# Patient Record
Sex: MALE | Race: OTHER
[De-identification: names, ages, dates, MRNs, and addresses within clinical notes are randomized per-mention and may not be internally consistent; named-entity substitution may affect disease eponyms.]

---

## 2019-01-01 ENCOUNTER — HOSPITAL ENCOUNTER (EMERGENCY)
Dept: HOSPITAL 25 - UCEAST | Age: 54
Discharge: HOME | End: 2019-01-01
Payer: COMMERCIAL

## 2019-01-01 VITALS — SYSTOLIC BLOOD PRESSURE: 124 MMHG | DIASTOLIC BLOOD PRESSURE: 72 MMHG

## 2019-01-01 DIAGNOSIS — J09.X2: Primary | ICD-10-CM

## 2019-01-01 DIAGNOSIS — Z87.891: ICD-10-CM

## 2019-01-01 PROCEDURE — G0463 HOSPITAL OUTPT CLINIC VISIT: HCPCS

## 2019-01-01 PROCEDURE — 99212 OFFICE O/P EST SF 10 MIN: CPT

## 2019-01-01 NOTE — UC
FLU HPI





- HPI Summary


HPI Summary: 


3 DAYS AGO DEVELOPED COUGH, RUNNY NOSE AND ST. FELT BETTER AFTER A COUPLE OF 

DAYS BUT YESTERDAY DEVELOPED FEVER 102, BODY ACHES, JOINT ACHES, HA AND 

FATIGUE. UP TO DATE FLU VACCINE.





- History of Current Complaint


Chief Complaint: UCGeneralIllness


Stated Complaint: FEVER


Time Seen by Provider: 01/01/19 07:24


Hx Obtained From: Patient


Onset/Duration: Sudden Onset, Lasting Days, Still Present


Severity Currently: Moderate


Severity Initially: Moderate


Pain Intensity: 7


Pain Scale Used: 0-10 Numeric


Associated Signs & Symptoms: Positive: Fever, Myalgia, Cough, Sore Throat, 

Nasal Congestion, Headache





- Allergy/Home Medications


Allergies/Adverse Reactions: 


 Allergies











Allergy/AdvReac Type Severity Reaction Status Date / Time


 


No Known Allergies Allergy   Verified 01/01/19 07:34











Home Medications: 


 Home Medications





Fexofenadine/Pseudoephedrine [Allegra-D 24 Hour Tablet] 1 tab PO DAILY PRN 01/01 /19 [History Confirmed 01/01/19]


Guaifenesin/Dextromethorphan [Mucinex Dm ER 1,200-60 mg Tab] 1 tab PO BID PRN 01 /01/19 [History Confirmed 01/01/19]











PMH/Surg Hx/FS Hx/Imm Hx


Previously Healthy: Yes





- Surgical History


Surgical History: Yes


Surgery Procedure, Year, and Place: APPENDIX-KIDNEY STONES.  right shoulder 

repair





- Family History


Known Family History: Positive: Non-Contributory





- Social History


Alcohol Use: Weekly


Substance Use Type: None


Smoking Status (MU): Former Smoker


When Did the Patient Quit Smoking/Using Tobacco: 6 YEARS AGO





- Immunization History


Most Recent Tetanus Shot: UNK





Review of Systems


All Other Systems Reviewed And Are Negative: Yes


Constitutional: Positive: Fever, Chills, Fatigue


ENT: Positive: Nasal Discharge


Respiratory: Positive: Cough


Cardiovascular: Positive: Negative


Gastrointestinal: Positive: Negative


Musculoskeletal: Positive: Arthralgia, Myalgia


Neurological: Positive: Headache





Physical Exam


Triage Information Reviewed: Yes


Appearance: No Pain Distress, Well-Nourished, Ill-Appearing - MILD


Vital Signs: 


 Initial Vital Signs











Temp  96.9 F   01/01/19 07:25


 


Pulse  83   01/01/19 07:25


 


Resp  20   01/01/19 07:25


 


BP  124/72   01/01/19 07:25


 


Pulse Ox  98   01/01/19 07:25








 Laboratory Tests











  01/01/19





  07:50


 


Influenza A (Rapid)  Positive A











Vital Signs Reviewed: Yes


Eyes: Positive: Conjunctiva Clear


ENT: Positive: Hearing grossly normal, Pharynx normal, TMs normal


Neck: Positive: Supple, Nontender, No Lymphadenopathy


Respiratory Exam: Normal


Cardiovascular Exam: Normal


Abdomen Description: Positive: Soft


Musculoskeletal: Positive: No Edema


Neurological: Positive: Alert


Psychological: Positive: Age Appropriate Behavior


Skin: Negative: Rashes





Flu Course/Dx





- Differential Dx/Diagnosis


Provider Diagnosis: 


 Influenza A








Discharge





- Sign-Out/Discharge


Documenting (check all that apply): Patient Departure


All imaging exams completed and their final reports reviewed: No Studies





- Discharge Plan


Condition: Stable


Disposition: HOME


Prescriptions: 


Oseltamivir CAP* [Tamiflu CAP*] 75 mg PO BID #10 cap


Patient Education Materials:  Influenza (ED)


Referrals: 


Tung Alcazar MD [Primary Care Provider] - If Needed


Additional Instructions: 


SWAB POSITIVE FOR INFLUENZA A. TAMIFLU TWICE DAILY FOR 5 DAYS. OTC MEDS AS 

NEEDED FOR FEVER, BODY ACHES. STAY WELL HYDRATED AND RESTED. SEEK FOLLOW-UP IF 

YOU ARE NOT IMPROVING AS EXPECTED. 





- Billing Disposition and Condition


Condition: STABLE


Disposition: Home

## 2019-01-07 ENCOUNTER — HOSPITAL ENCOUNTER (EMERGENCY)
Dept: HOSPITAL 25 - ED | Age: 54
Discharge: HOME | End: 2019-01-07
Payer: COMMERCIAL

## 2019-01-07 VITALS — DIASTOLIC BLOOD PRESSURE: 81 MMHG | SYSTOLIC BLOOD PRESSURE: 136 MMHG

## 2019-01-07 DIAGNOSIS — R73.9: ICD-10-CM

## 2019-01-07 DIAGNOSIS — J18.9: Primary | ICD-10-CM

## 2019-01-07 DIAGNOSIS — Z87.891: ICD-10-CM

## 2019-01-07 DIAGNOSIS — R00.0: ICD-10-CM

## 2019-01-07 LAB
ALBUMIN SERPL BCG-MCNC: 4.1 G/DL (ref 3.2–5.2)
ALBUMIN/GLOB SERPL: 1.4 {RATIO} (ref 1–3)
ALP SERPL-CCNC: 72 U/L (ref 34–104)
ALT SERPL W P-5'-P-CCNC: 32 U/L (ref 7–52)
ANION GAP SERPL CALC-SCNC: 8 MMOL/L (ref 2–11)
AST SERPL-CCNC: 15 U/L (ref 13–39)
BASOPHILS # BLD AUTO: 0 10^3/UL (ref 0–0.2)
BUN SERPL-MCNC: 15 MG/DL (ref 6–24)
BUN/CREAT SERPL: 16 (ref 8–20)
CALCIUM SERPL-MCNC: 9.4 MG/DL (ref 8.6–10.3)
CHLORIDE SERPL-SCNC: 101 MMOL/L (ref 101–111)
CK SERPL-CCNC: 59 U/L (ref 10–223)
EOSINOPHIL # BLD AUTO: 0.2 10^3/UL (ref 0–0.6)
GLOBULIN SER CALC-MCNC: 2.9 G/DL (ref 2–4)
GLUCOSE SERPL-MCNC: 269 MG/DL (ref 70–100)
HCO3 SERPL-SCNC: 27 MMOL/L (ref 22–32)
HCT VFR BLD AUTO: 44 % (ref 42–52)
HGB BLD-MCNC: 15.1 G/DL (ref 14–18)
LYMPHOCYTES # BLD AUTO: 1.5 10^3/UL (ref 1–4.8)
MCH RBC QN AUTO: 29 PG (ref 27–31)
MCHC RBC AUTO-ENTMCNC: 34 G/DL (ref 31–36)
MCV RBC AUTO: 86 FL (ref 80–94)
MONOCYTES # BLD AUTO: 1 10^3/UL (ref 0–0.8)
NEUTROPHILS # BLD AUTO: 4.4 10^3/UL (ref 1.5–7.7)
NRBC # BLD AUTO: 0 10^3/UL
NRBC BLD QL AUTO: 0
PLATELET # BLD AUTO: 239 10^3/UL (ref 150–450)
POTASSIUM SERPL-SCNC: 3.7 MMOL/L (ref 3.5–5)
PROT SERPL-MCNC: 7 G/DL (ref 6.4–8.9)
RBC # BLD AUTO: 5.16 10^6/UL (ref 4–5.4)
SODIUM SERPL-SCNC: 136 MMOL/L (ref 135–145)
WBC # BLD AUTO: 7 10^3/UL (ref 3.5–10.8)

## 2019-01-07 PROCEDURE — 99283 EMERGENCY DEPT VISIT LOW MDM: CPT

## 2019-01-07 PROCEDURE — 36415 COLL VENOUS BLD VENIPUNCTURE: CPT

## 2019-01-07 PROCEDURE — 93005 ELECTROCARDIOGRAM TRACING: CPT

## 2019-01-07 PROCEDURE — 96360 HYDRATION IV INFUSION INIT: CPT

## 2019-01-07 PROCEDURE — 83605 ASSAY OF LACTIC ACID: CPT

## 2019-01-07 PROCEDURE — 82553 CREATINE MB FRACTION: CPT

## 2019-01-07 PROCEDURE — 83880 ASSAY OF NATRIURETIC PEPTIDE: CPT

## 2019-01-07 PROCEDURE — 71046 X-RAY EXAM CHEST 2 VIEWS: CPT

## 2019-01-07 PROCEDURE — 85025 COMPLETE CBC W/AUTO DIFF WBC: CPT

## 2019-01-07 PROCEDURE — 84484 ASSAY OF TROPONIN QUANT: CPT

## 2019-01-07 PROCEDURE — 80053 COMPREHEN METABOLIC PANEL: CPT

## 2019-01-07 PROCEDURE — 86140 C-REACTIVE PROTEIN: CPT

## 2019-01-07 PROCEDURE — 82550 ASSAY OF CK (CPK): CPT

## 2019-01-07 RX ADMIN — ALBUTEROL SULFATE SCH MG: 2.5 SOLUTION RESPIRATORY (INHALATION) at 19:35

## 2019-01-07 RX ADMIN — ALBUTEROL SULFATE SCH MG: 2.5 SOLUTION RESPIRATORY (INHALATION) at 19:08

## 2019-01-07 RX ADMIN — ALBUTEROL SULFATE SCH MG: 2.5 SOLUTION RESPIRATORY (INHALATION) at 19:18

## 2019-01-07 NOTE — ED
Respiratory





- HPI Summary


HPI Summary: 


This patient is a 53 year old M presenting to [facility] accompanied by family with 

a chief complaint of cough that began yesterday. The patient rates the pain 2/

10 in severity. Symptoms aggravated by nothing. Symptoms alleviated by nothing. 

Patient reports weakness and SOB. Patient denies fever. Patient states he was 

diagnosed with the flu on 12/28. Patient states he was feeling better until the 

cough developed.








- History of Current Complaint


Chief Complaint: EDUpperRespComplaint


Stated Complaint: COUGH


Time Seen by Provider: 01/07/19 18:37


Hx Obtained From: Patient


Onset/Duration: Sudden Onset, Lasting Days, Still Present


Timing: Constant


Initial Severity: Mild


Current Severity: Mild


Pain Intensity: 2


Aggravating Factor(s): Nothing


Alleviating Factor(s): Nothing


Associated Signs and Symptoms: SOB





- Allergy/Home Medications


Allergies/Adverse Reactions: 


 Allergies











Allergy/AdvReac Type Severity Reaction Status Date / Time


 


No Known Allergies Allergy   Verified 01/07/19 18:31














PMH/Surg Hx/FS Hx/Imm Hx


Previously Healthy: No


Endocrine/Hematology History: 


   Denies: Hx Diabetes


Cardiovascular History: Reports: Hx Hypercholesterolemia


   Denies: Hx Hypertension, Hx Pacemaker/ICD


 History: Reports: Hx Kidney Stones - 2013 LAST TIME


   Denies: Hx Renal Disease


Sensory History: Reports: Hx Contacts or Glasses


   Denies: Hx Hearing Aid


Opthamlomology History: Reports: Hx Contacts or Glasses


Psychiatric History: 


   Denies: Hx Panic Disorder





- Surgical History


Surgery Procedure, Year, and Place: APPENDIX-KIDNEY STONES.  right shoulder 

repair


Hx Anesthesia Reactions: No


Infectious Disease History: No


Infectious Disease History: 


   Denies: Traveled Outside the [place] in Last 30 Days





- Family History


Known Family History: Positive: Other - Negative anesthesia reaction





- Social History


Occupation: Employed Full-time


Lives: With Family


Alcohol Use: Weekly


Substance Use Type: Reports: None


Smoking Status (MU): Former Smoker





Review of Systems


Negative: Fever


Positive: Shortness Of Breath, Cough


Positive: Weakness


All Other Systems Reviewed And Are Negative: Yes





Physical Exam





- Summary


Physical Exam Summary: 


VITAL SIGNS: Reviewed.


GENERAL: Patient is a well-developed and nourished (MALE OR FEMALE) who is 

lying comfortable in the stretcher. Patient is not in any acute respiratory 

distress.


HEAD AND FACE: No signs of trauma. No ecchymosis, hematomas or skull 

depressions. No sinus tenderness.


EYES: PERRLA, EOMI x 2, No injected conjunctiva, no nystagmus.


EARS: Hearing grossly intact. Ear canals and tympanic membranes are within 

normal limits.


MOUTH: Oropharynx within normal limits.


NECK: Supple, trachea is midline, no adenopathy, no JVD, no carotid bruit, no c-

spine tenderness, neck with full ROM.


CHEST: Symmetric, no tenderness at palpation


LUNGS: No wheezing or crackles. Decreased breath sounds bilaterally.


CVS: Regular rate and rhythm, S1 and S2 present, no murmurs or gallops 

appreciated.


ABDOMEN: Soft, non-tender. No signs of distention. No rebound no guarding, and 

no masses palpated. Bowel sounds are normal.


EXTREMITIES: FROM in all major joints, no edema, no cyanosis or clubbing.


NEURO: Alert and oriented x 3. No acute neurological deficits. Speech is normal 

and follows commands.


SKIN: Dry and warm





Triage Information Reviewed: Yes


Vital Signs On Initial Exam: 


 Initial Vitals











Temp Pulse Resp BP Pulse Ox


 


 97.7 F   86   16   142/95   98 


 


 01/07/19 18:31  01/07/19 18:31  01/07/19 18:31  01/07/19 18:31  01/07/19 18:31











Vital Signs Reviewed: Yes





Diagnostics





- Vital Signs


 Vital Signs











  Temp Pulse Resp BP Pulse Ox


 


 01/07/19 18:31  97.7 F  86  16  142/95  98














- Laboratory


Result Diagrams: 


 01/07/19 18:54





 01/07/19 18:54


Lab Statement: Any lab studies that have been ordered have been reviewed, and 

results considered in the medical decision making process.





- Radiology


  ** Chest XR


Radiology Interpretation Completed By: ED Physician


Summary of Radiographic Findings: CXR reveals, per ED physician, questionable 

right lower lobe infiltrate.





- EKG


  ** 1935


Cardiac Rate: Tachycardia


EKG Rhythm: Sinus Rhythm - 111 BPM


Summary of EKG Findings: An EKG taken at 1935 reveals sinus tachycardia at 111 

BPM with no ST elevations





Disposition





- Course


Assessment/Plan: This patient is a 53 year old M presenting to [facility] 

accompanied by family with a chief complaint of cough that began yesterday. The 

patient rates the pain 2/10 in severity. Symptoms aggravated by nothing. 

Symptoms alleviated by nothing. Patient reports weakness and SOB. Patient 

denies fever. Patient states he was diagnosed with the flu on 12/28. Patient 

states he was feeling better until the cough developed.  The test results 

without any significant abnormality except for glucose of 269 and lactic acid 

2.2. CRP is a 48.5.  Chest x-ray revealed that the patient has a left lower 

lobe early pneumonia.  Since the patient was wheezing the patient was given 

albuterol and the symptoms improved.  The patient also was given IV fluids to 

decrease the sugar and he was started on metformin.  The patient also was given 

azithromycin since I believe that the patient has an early pneumonia.  He was 

also given Robitussin and the symptoms significantly improved.  I discussed all 

the findings and test results with the patient. Patient was instructed to 

return to the emergency room immediately if any of the symptoms return or 

worsens. Plan of care was discussed with the patient and understands and 

agrees. All questions were answered at patient satisfaction.  There were no 

further complaints or concerns. Lung exam before discharge: CTA B/L. Good air 

exchange. No wheezing or crackles heard. CVS: S1 and S2 present. No murmurs 

appreciated. Patient is alert and oriented x 3. Patient is hemodynamically 

stable. Patient will be discharged home with follow up PCP in the next 2-3 days





- Diagnoses


Provider Diagnoses: 


 Pneumonia, Hyperglycemia








Discharge





- Sign-Out/Discharge


Documenting (check all that apply): Patient Departure - Discharge home





- Discharge Plan


Condition: Stable


Disposition: HOME


Prescriptions: 


Albuterol HFA INHALER* [Ventolin HFA Inhaler*] 1 puff INH Q4H #1 mdi


Azithromycin TAB* [Zithromax TAB (Z-MAGALIE) 250 mg #6 tabs] 250 mg PO DAILY #4 tab


guaiFENesin/CODIEN 100MG-10MG* [Robitussin AC 100Mg-10Mg*] 10 ml PO Q6H PRN #

160 ml MDD 40 ml


 PRN Reason: Cough


metFORMIN* [Glucophage 500 MG TAB *] 500 mg PO BID #40 tab


Patient Education Materials:  Albuterol (By breathing), Azithromycin (By mouth)

, Metformin (By mouth), Antitussive/Decongestant/Expectorant (By mouth), 

Pneumonia (ED)


Referrals: 


Tung Alcazar MD [Primary Care Provider] - 2 Days


Additional Instructions: 


RETURN TO THE EMERGENCY DEPARTMENT FOR NEW OR WORSENING SYMPTOMS





- Billing Disposition and Condition


Condition: STABLE


Disposition: Home





- Attestation Statements


Document Initiated by Scribe: Yes


Documenting Scribe: Tabitha Roberson


Provider For Whom Scribe is Documenting (Include Credential): Dr. Cruz London MD


Scribe Attestation: 


I, Tabitha Roberson, scribed for Dr. Cruz London MD on 01/08/19 at 2055. 


Scribe Documentation Reviewed: Yes


Provider Attestation: 


The documentation as recorded by the [email or web address] accurately reflects the 

service I personally performed and the decisions made by me, Dr. Cruz London MD


Status of Scribe Document: Viewed

## 2019-03-03 ENCOUNTER — HOSPITAL ENCOUNTER (INPATIENT)
Dept: HOSPITAL 25 - ED | Age: 54
LOS: 4 days | Discharge: HOME | DRG: 174 | End: 2019-03-07
Attending: INTERNAL MEDICINE | Admitting: INTERNAL MEDICINE
Payer: COMMERCIAL

## 2019-03-03 DIAGNOSIS — Z79.82: ICD-10-CM

## 2019-03-03 DIAGNOSIS — R00.1: ICD-10-CM

## 2019-03-03 DIAGNOSIS — Z87.891: ICD-10-CM

## 2019-03-03 DIAGNOSIS — E78.5: ICD-10-CM

## 2019-03-03 DIAGNOSIS — I34.0: ICD-10-CM

## 2019-03-03 DIAGNOSIS — Z87.442: ICD-10-CM

## 2019-03-03 DIAGNOSIS — I25.5: ICD-10-CM

## 2019-03-03 DIAGNOSIS — Z82.49: ICD-10-CM

## 2019-03-03 DIAGNOSIS — I25.10: ICD-10-CM

## 2019-03-03 DIAGNOSIS — I21.09: Primary | ICD-10-CM

## 2019-03-03 DIAGNOSIS — E11.9: ICD-10-CM

## 2019-03-03 DIAGNOSIS — Z79.02: ICD-10-CM

## 2019-03-03 DIAGNOSIS — J45.909: ICD-10-CM

## 2019-03-03 LAB
ALBUMIN SERPL BCG-MCNC: 4.6 G/DL (ref 3.2–5.2)
ALBUMIN/GLOB SERPL: 1.8 {RATIO} (ref 1–3)
ALP SERPL-CCNC: 64 U/L (ref 34–104)
ALT SERPL W P-5'-P-CCNC: 28 U/L (ref 7–52)
ANION GAP SERPL CALC-SCNC: 12 MMOL/L (ref 2–11)
APTT PPP: 22.9 SECONDS (ref 26–36.3)
AST SERPL-CCNC: 18 U/L (ref 13–39)
BASOPHILS # BLD AUTO: 0 10^3/UL (ref 0–0.2)
BUN SERPL-MCNC: 23 MG/DL (ref 6–24)
BUN/CREAT SERPL: 20.9 (ref 8–20)
CALCIUM SERPL-MCNC: 9.8 MG/DL (ref 8.6–10.3)
CHLORIDE SERPL-SCNC: 102 MMOL/L (ref 101–111)
CK MB SERPL-MCNC: 1.2 NG/ML (ref 0.6–6.3)
CK SERPL-CCNC: 75 U/L (ref 10–223)
EOSINOPHIL # BLD AUTO: 0.2 10^3/UL (ref 0–0.6)
GLOBULIN SER CALC-MCNC: 2.5 G/DL (ref 2–4)
GLUCOSE SERPL-MCNC: 230 MG/DL (ref 70–100)
HCO3 SERPL-SCNC: 25 MMOL/L (ref 22–32)
HCT VFR BLD AUTO: 44 % (ref 42–52)
HGB BLD-MCNC: 14.9 G/DL (ref 14–18)
INR PPP/BLD: 0.95 (ref 0.77–1.02)
LYMPHOCYTES # BLD AUTO: 2.2 10^3/UL (ref 1–4.8)
MCH RBC QN AUTO: 29 PG (ref 27–31)
MCHC RBC AUTO-ENTMCNC: 34 G/DL (ref 31–36)
MCV RBC AUTO: 86 FL (ref 80–94)
MONOCYTES # BLD AUTO: 0.7 10^3/UL (ref 0–0.8)
NEUTROPHILS # BLD AUTO: 4.2 10^3/UL (ref 1.5–7.7)
NRBC # BLD AUTO: 0 10^3/UL
NRBC BLD QL AUTO: 0.1
PLATELET # BLD AUTO: 193 10^3/UL (ref 150–450)
POTASSIUM SERPL-SCNC: 3.7 MMOL/L (ref 3.5–5)
PROT SERPL-MCNC: 7.1 G/DL (ref 6.4–8.9)
RBC # BLD AUTO: 5.16 10^6/UL (ref 4–5.4)
SODIUM SERPL-SCNC: 139 MMOL/L (ref 135–145)
TROPONIN I SERPL-MCNC: 0.03 NG/ML (ref ?–0.04)
WBC # BLD AUTO: 7.3 10^3/UL (ref 3.5–10.8)

## 2019-03-03 PROCEDURE — 99284 EMERGENCY DEPT VISIT MOD MDM: CPT

## 2019-03-03 PROCEDURE — 80053 COMPREHEN METABOLIC PANEL: CPT

## 2019-03-03 PROCEDURE — 36415 COLL VENOUS BLD VENIPUNCTURE: CPT

## 2019-03-03 PROCEDURE — 82947 ASSAY GLUCOSE BLOOD QUANT: CPT

## 2019-03-03 PROCEDURE — 83036 HEMOGLOBIN GLYCOSYLATED A1C: CPT

## 2019-03-03 PROCEDURE — 71045 X-RAY EXAM CHEST 1 VIEW: CPT

## 2019-03-03 PROCEDURE — C1887 CATHETER, GUIDING: HCPCS

## 2019-03-03 PROCEDURE — 93306 TTE W/DOPPLER COMPLETE: CPT

## 2019-03-03 PROCEDURE — 83880 ASSAY OF NATRIURETIC PEPTIDE: CPT

## 2019-03-03 PROCEDURE — 85347 COAGULATION TIME ACTIVATED: CPT

## 2019-03-03 PROCEDURE — 85730 THROMBOPLASTIN TIME PARTIAL: CPT

## 2019-03-03 PROCEDURE — 82553 CREATINE MB FRACTION: CPT

## 2019-03-03 PROCEDURE — 86900 BLOOD TYPING SEROLOGIC ABO: CPT

## 2019-03-03 PROCEDURE — B2151ZZ FLUOROSCOPY OF LEFT HEART USING LOW OSMOLAR CONTRAST: ICD-10-PCS | Performed by: INTERNAL MEDICINE

## 2019-03-03 PROCEDURE — 027034Z DILATION OF CORONARY ARTERY, ONE ARTERY WITH DRUG-ELUTING INTRALUMINAL DEVICE, PERCUTANEOUS APPROACH: ICD-10-PCS | Performed by: INTERNAL MEDICINE

## 2019-03-03 PROCEDURE — C8929 TTE W OR WO FOL WCON,DOPPLER: HCPCS

## 2019-03-03 PROCEDURE — C1725 CATH, TRANSLUMIN NON-LASER: HCPCS

## 2019-03-03 PROCEDURE — 96375 TX/PRO/DX INJ NEW DRUG ADDON: CPT

## 2019-03-03 PROCEDURE — C8924 2D TTE W OR W/O FOL W/CON,FU: HCPCS

## 2019-03-03 PROCEDURE — 4A023N7 MEASUREMENT OF CARDIAC SAMPLING AND PRESSURE, LEFT HEART, PERCUTANEOUS APPROACH: ICD-10-PCS | Performed by: INTERNAL MEDICINE

## 2019-03-03 PROCEDURE — 96374 THER/PROPH/DIAG INJ IV PUSH: CPT

## 2019-03-03 PROCEDURE — 87641 MR-STAPH DNA AMP PROBE: CPT

## 2019-03-03 PROCEDURE — 83874 ASSAY OF MYOGLOBIN: CPT

## 2019-03-03 PROCEDURE — 80048 BASIC METABOLIC PNL TOTAL CA: CPT

## 2019-03-03 PROCEDURE — 82550 ASSAY OF CK (CPK): CPT

## 2019-03-03 PROCEDURE — C1769 GUIDE WIRE: HCPCS

## 2019-03-03 PROCEDURE — 84484 ASSAY OF TROPONIN QUANT: CPT

## 2019-03-03 PROCEDURE — 85025 COMPLETE CBC W/AUTO DIFF WBC: CPT

## 2019-03-03 PROCEDURE — 86901 BLOOD TYPING SEROLOGIC RH(D): CPT

## 2019-03-03 PROCEDURE — B2111ZZ FLUOROSCOPY OF MULTIPLE CORONARY ARTERIES USING LOW OSMOLAR CONTRAST: ICD-10-PCS | Performed by: INTERNAL MEDICINE

## 2019-03-03 PROCEDURE — 83721 ASSAY OF BLOOD LIPOPROTEIN: CPT

## 2019-03-03 PROCEDURE — 86850 RBC ANTIBODY SCREEN: CPT

## 2019-03-03 PROCEDURE — 71275 CT ANGIOGRAPHY CHEST: CPT

## 2019-03-03 PROCEDURE — 76937 US GUIDE VASCULAR ACCESS: CPT

## 2019-03-03 PROCEDURE — 80061 LIPID PANEL: CPT

## 2019-03-03 PROCEDURE — 93308 TTE F-UP OR LMTD: CPT

## 2019-03-03 PROCEDURE — 85610 PROTHROMBIN TIME: CPT

## 2019-03-03 PROCEDURE — 83605 ASSAY OF LACTIC ACID: CPT

## 2019-03-03 PROCEDURE — 74174 CTA ABD&PLVS W/CONTRAST: CPT

## 2019-03-03 PROCEDURE — C1876 STENT, NON-COA/NON-COV W/DEL: HCPCS

## 2019-03-03 PROCEDURE — 93005 ELECTROCARDIOGRAM TRACING: CPT

## 2019-03-03 NOTE — ED
HPI Chest Pain





- HPI Summary


HPI Summary: 


This patient is a 53 year old M brought in by ambulance to ED with a chief 

complaint of upper CP since 2050 s/p getting hit in the anterior chest with a 

soccer ball. Shortly after, he had CP radiating into the back between his 

shoulder blades. The patient was given 324mg ASA and 2 nitro by EMS en route to 

the ED. He says that his CP has decreased a bit but his back pain has worsened 

since onset. He reports no cardiac hx himself but has a lot of cardiac hx in 

the family. The patient rates the pain 6/10 in severity. Symptoms aggravated by 

nothing. Symptoms alleviated by nothing. Patient reports L arm numbness and N/V 

(aggravated by standing up). Patient denies diaphoresis. He reports no similar 

episodes of this pain in the past. PMHx of HM and HLD.





- History of Current Complaint


Time Seen by Provider: 03/03/19 22:07


Hx Obtained From: Patient, EMS


Onset/Duration: Started Hours Ago - at 2050, Still Present


Timing: Constant, Lasting Hours


Initial Severity: Moderate


Current Severity: Moderate


Pain Intensity: 6


Pain Scale Used: 0-10 Numeric


Chest Pain Location: Discrete at: - upper chest pain


Chest Pain Radiates: Yes


Chest Pain Radiates To:: Back - between his shoulder blades


Aggravating Factor(s): Nothing


Alleviating Factor(s): Nothing


Associated Signs and Symptoms: Positive: Chest Pain, Numbness - L arm, 

Vomiting.  Negative: Diaphoresis





- Allergy/Home Medications


Allergies/Adverse Reactions: 


 Allergies











Allergy/AdvReac Type Severity Reaction Status Date / Time


 


No Known Allergies Allergy   Verified 01/07/19 18:31











Home Medications: 


 Home Medications





Rosuvastatin Calcium 1 tab PO DAILY 03/03/19 [History Confirmed 03/03/19]











PMH/Surg Hx/FS Hx/Imm Hx


Endocrine/Hematology History: 


   Denies: Hx Diabetes


Cardiovascular History: Reports: Hx Hypercholesterolemia


   Denies: Hx Hypertension, Hx Pacemaker/ICD


Respiratory History: 


   Denies: Hx Asthma, Hx Chronic Obstructive Pulmonary Disease (COPD)


 History: Reports: Hx Kidney Stones - 2013 LAST TIME


   Denies: Hx Renal Disease


Sensory History: Reports: Hx Contacts or Glasses


   Denies: Hx Hearing Aid


Opthamlomology History: Reports: Hx Contacts or Glasses


Psychiatric History: 


   Denies: Hx Panic Disorder





- Surgical History


Surgery Procedure, Year, and Place: APPENDIX-KIDNEY STONES.  right shoulder 

repair


Hx Anesthesia Reactions: No





- Family History


Known Family History: Positive: Cardiac Disease, Other - Negative anesthesia 

reaction





- Social History


Alcohol Use: Weekly


Substance Use Type: Reports: None


Smoking Status (MU): Former Smoker





Review of Systems


Negative: Skin Diaphoresis


Positive: Chest Pain - upper CP radiating to the back between the shoulder 

blades


Positive: Vomiting, Nausea


Positive: Other - upper CP radiating to the back between the shoulder blades


Positive: Numbness - L arm


All Other Systems Reviewed And Are Negative: Yes





Physical Exam





- Summary


Physical Exam Summary: 


VITAL SIGNS: Reviewed.


GENERAL: Patient is a well-developed and nourished MALE who is lying 

comfortable in the stretcher. Patient is not in any acute respiratory distress.


HEAD AND FACE: No signs of trauma. No ecchymosis, hematomas or skull 

depressions. No sinus tenderness.


EYES: PERRLA, EOMI x 2, No injected conjunctiva, no nystagmus.


EARS: Hearing grossly intact. Ear canals and tympanic membranes are within 

normal limits.


MOUTH: Oropharynx within normal limits.


NECK: Supple, trachea is midline, no adenopathy, no JVD, no carotid bruit, no c-

spine tenderness, neck with full ROM.


CHEST: Symmetric, no tenderness at palpation


LUNGS: Clear to auscultation bilaterally. No wheezing or crackles.


CVS: Bradycardia, Regular rhythm, S1 and S2 present, no murmurs or gallops 

appreciated.


ABDOMEN: Soft, non-tender. No signs of distention. No rebound no guarding, and 

no masses palpated. Bowel sounds are normal.


EXTREMITIES: FROM in all major joints, no edema, no cyanosis or clubbing.


NEURO: Alert and oriented x 3. No acute neurological deficits. Speech is normal 

and follows commands.


SKIN: Dry and warm


Triage Information Reviewed: Yes


Vital Signs On Initial Exam: 





 Initial Vitals











Temp Pulse Resp BP Pulse Ox


 


 97.8 F   58   18   137/94   99 


 


 03/03/19 22:06  03/03/19 22:06  03/03/19 22:06  03/03/19 22:06  03/03/19 22:06











Vital Signs Reviewed: Yes





Diagnostics





- Laboratory


Result Diagrams: 


 03/04/19 05:20





 03/04/19 05:20


Lab Statement: Any lab studies that have been ordered have been reviewed, and 

results considered in the medical decision making process.





- Radiology


  ** CXR


Radiology Interpretation Completed By: ED Physician


Summary of Radiographic Findings: No acute processes. Pending radiologist 

official report.





- CT


  ** CTA chest/abd/pel


CT Interpretation Completed By: Radiologist


Summary of CT Findings: 1. No pulmonary emboli.  2. No aortic dissection, 

aneurysm, or rupture. No additional findings to.  correlate with patient's 

symptomatology.  3. Small hepatic focus likely an arterial portal shunt, flash 

filling.  hemangioma, or FNH. No followup is indicated.  Dr. Munoz has reviewed 

this radiology report.





- EKG


  ** 2208


Cardiac Rate: Bradycardia - 55 BPM


EKG Rhythm: Sinus Bradycardia


Summary of EKG Findings: Peaked T-wave in anteroseptal leads with depression in 

inferior leads.





Re-Evaluation





- Re-Evaluation


  ** First Eval


Re-Evaluation Time: 22:23


Comment: Discussed consult with Dr. Pulido with the patient and plan for 

further care of the patient.





Chest Pain Course/Dx





- Course


Assessment/Plan: This patient is a 53 year old M brought in by ambulance to ED 

with a chief complaint of upper CP since 2050 s/p getting hit in the anterior 

chest with a soccer ball. Shortly after, he had CP radiating into the back 

between his shoulder blades. The patient was given ASA and 2 nitro en route to 

the ED. EKG reveals sinus bradycardia at 55 BPM with peaked T-wave in 

anteroseptal leads with depression in inferior leads. Consulted Dr. Pulido to 

discuss with him about the patient's case. He says to call a STEMI and to do a 

CTA chest/abd/pel. STEMI called at 2221. Consulted Dr. Pulido at 2242 about the 

CTA chest/abd/pel. Spoke with Dr. Pulido again at 2243 who says to hold off on 

heparin and brilinta in case the patient has myocardial contusion. Dr. Pulido 

accepted the patient for admission at 2306. CXR reveals no acute processes. CTA 

chest/abd/pel reveals 1. No pulmonary emboli.  2. No aortic dissection, aneurysm

, or rupture. No additional findings to  correlate with patient's 

symptomatology.  3. Small hepatic focus likely an arterial portal shunt, flash 

filling  hemangioma, or FNH. No followup is indicated. In the ED course, the 

patient was given Morphine and Zofran. Dr. Pulido saw patient in the ER at 

1048. The patient will be admitted with dx of CP and ST elevation MI. This 

patient understands and agrees with this plan.





- Chest Pain


Differential Diagnosis/HQI/PQRI: Other: - chest pain, ST elevation MI





- Diagnoses


Provider Diagnoses: 


 Chest pain, ST elevation MI (STEMI)








- Provider Notifications


Discussed Care Of Patient With: Jayy Pulido


Time Discussed With Above Provider: 22:17


Instructed by Provider To: Other - Consulted Dr. Pulido to discuss with him 

about the patient's case and ask him to take a look at the patient's EKG. He 

says to call a STEMI and to do a CTA chest/abd/pel to look for dissection. 

Consulted Dr. Pulido at 2242 about the CTA chest/abd/pel. Spoke with Dr. Pulido 

again at 2243 who says to hold off on heparin and brilinta in case the patient 

has myocardial contusion. Dr. Pulido accepts the patient for admission at 2306.





- Critical Care Time


Critical Care Time: 30-74 min - 61 minutes





Discharge





- Sign-Out/Discharge


Documenting (check all that apply): Patient Departure - admit


Patient Received Moderate/Deep Sedation with Procedure: No





- Discharge Plan


Condition: Stable


Disposition: ADMITTED TO [facility]





- Billing Disposition and Condition


Condition: STABLE


Disposition: Admitted to [facility]





- Attestation Statements


Document Initiated by Gisselle: Yes


Documenting Scribe: Panchito Nassar


Provider For Whom Gisselle is Documenting (Include Credential): Mayank Munoz MD


Scribe Attestation: 


Panchito THOMAS, scribed for Mayank Munoz MD on 03/04/19 at 0621. 


Scribe Documentation Reviewed: Yes


Provider Attestation: 


The documentation as recorded by the Panchito brown accurately reflects the 

service I personally performed and the decisions made by me, Mayank Munoz MD


Status of Scribe Document: Viewed

## 2019-03-04 LAB
ALBUMIN SERPL BCG-MCNC: 4 G/DL (ref 3.2–5.2)
ALBUMIN/GLOB SERPL: 1.9 {RATIO} (ref 1–3)
ALP SERPL-CCNC: 57 U/L (ref 34–104)
ALT SERPL W P-5'-P-CCNC: 48 U/L (ref 7–52)
ANION GAP SERPL CALC-SCNC: 8 MMOL/L (ref 2–11)
AST SERPL-CCNC: 149 U/L (ref 13–39)
BASOPHILS # BLD AUTO: 0 10^3/UL (ref 0–0.2)
BUN SERPL-MCNC: 15 MG/DL (ref 6–24)
BUN/CREAT SERPL: 20.3 (ref 8–20)
CALCIUM SERPL-MCNC: 8.5 MG/DL (ref 8.6–10.3)
CHLORIDE SERPL-SCNC: 106 MMOL/L (ref 101–111)
CHOLEST SERPL-MCNC: 126 MG/DL
CK MB SERPL-MCNC: 149.1 NG/ML (ref 0.6–6.3)
CK MB SERPL-MCNC: 236.1 NG/ML (ref 0.6–6.3)
CK MB SERPL-MCNC: 267.8 NG/ML (ref 0.6–6.3)
CK SERPL-CCNC: 1610 U/L (ref 10–223)
CK SERPL-CCNC: 1622 U/L (ref 10–223)
CK SERPL-CCNC: 1762 U/L (ref 10–223)
EOSINOPHIL # BLD AUTO: 0 10^3/UL (ref 0–0.6)
GLOBULIN SER CALC-MCNC: 2.1 G/DL (ref 2–4)
GLUCOSE SERPL-MCNC: 153 MG/DL (ref 70–100)
GLUCOSE SERPL-MCNC: 169 MG/DL (ref 70–100)
HCO3 SERPL-SCNC: 23 MMOL/L (ref 22–32)
HCT VFR BLD AUTO: 41 % (ref 42–52)
HDLC SERPL-MCNC: 37.7 MG/DL
HGB BLD-MCNC: 13.9 G/DL (ref 14–18)
LYMPHOCYTES # BLD AUTO: 1 10^3/UL (ref 1–4.8)
MCH RBC QN AUTO: 29 PG (ref 27–31)
MCHC RBC AUTO-ENTMCNC: 34 G/DL (ref 31–36)
MCV RBC AUTO: 86 FL (ref 80–94)
MONOCYTES # BLD AUTO: 0.6 10^3/UL (ref 0–0.8)
NEUTROPHILS # BLD AUTO: 5.9 10^3/UL (ref 1.5–7.7)
NRBC # BLD AUTO: 0 10^3/UL
NRBC BLD QL AUTO: 0
PLATELET # BLD AUTO: 181 10^3/UL (ref 150–450)
POTASSIUM SERPL-SCNC: 4 MMOL/L (ref 3.5–5)
PROT SERPL-MCNC: 6.1 G/DL (ref 6.4–8.9)
RBC # BLD AUTO: 4.74 10^6/UL (ref 4–5.4)
SODIUM SERPL-SCNC: 137 MMOL/L (ref 135–145)
TRIGL SERPL-MCNC: 106 MG/DL
TROPONIN I SERPL-MCNC: 38.36 NG/ML (ref ?–0.04)
TROPONIN I SERPL-MCNC: 51.76 NG/ML (ref ?–0.04)
TROPONIN I SERPL-MCNC: 65.91 NG/ML (ref ?–0.04)
WBC # BLD AUTO: 7.5 10^3/UL (ref 3.5–10.8)

## 2019-03-04 RX ADMIN — CAPTOPRIL SCH MG: 12.5 TABLET ORAL at 08:26

## 2019-03-04 RX ADMIN — ASPIRIN SCH MG: 81 TABLET, CHEWABLE ORAL at 08:26

## 2019-03-04 RX ADMIN — INSULIN LISPRO SCH UNITS: 100 INJECTION, SOLUTION INTRAVENOUS; SUBCUTANEOUS at 14:33

## 2019-03-04 RX ADMIN — ROSUVASTATIN CALCIUM SCH MG: 20 TABLET, FILM COATED ORAL at 21:14

## 2019-03-04 RX ADMIN — TICAGRELOR SCH MG: 90 TABLET ORAL at 08:26

## 2019-03-04 RX ADMIN — CAPTOPRIL SCH MG: 12.5 TABLET ORAL at 14:30

## 2019-03-04 RX ADMIN — TICAGRELOR SCH MG: 90 TABLET ORAL at 21:03

## 2019-03-04 RX ADMIN — INSULIN LISPRO SCH UNIT: 100 INJECTION, SOLUTION INTRAVENOUS; SUBCUTANEOUS at 21:29

## 2019-03-04 RX ADMIN — METOPROLOL TARTRATE SCH MG: 25 TABLET, FILM COATED ORAL at 21:03

## 2019-03-04 RX ADMIN — INSULIN LISPRO SCH UNIT: 100 INJECTION, SOLUTION INTRAVENOUS; SUBCUTANEOUS at 17:18

## 2019-03-04 RX ADMIN — ENOXAPARIN SODIUM SCH MG: 100 INJECTION SUBCUTANEOUS at 21:14

## 2019-03-04 RX ADMIN — METOPROLOL TARTRATE SCH MG: 25 TABLET, FILM COATED ORAL at 08:26

## 2019-03-04 RX ADMIN — CAPTOPRIL SCH MG: 12.5 TABLET ORAL at 21:04

## 2019-03-04 RX ADMIN — METOPROLOL TARTRATE SCH MG: 25 TABLET, FILM COATED ORAL at 00:44

## 2019-03-04 RX ADMIN — INSULIN LISPRO SCH UNITS: 100 INJECTION, SOLUTION INTRAVENOUS; SUBCUTANEOUS at 06:20

## 2019-03-04 NOTE — CATH
CC:  Dr. Tung Alcazar *

 

CARDIAC CATHETERIZATION REPORT AND INTERVENTIONAL REPORT:

 

DATE OF PROCEDURE:  03/03/19

 

INDICATION FOR THE PROCEDURE:  Patient presents with subtle EKG changes and 
chest and back discomfort suggesting the presence of possible acute ST-segment 
elevation anterior wall myocardial infarction.

 

PROCEDURE:  Coronary arteriography, left heart catheterization, left 
ventriculography, primary stenting of the proximal left anterior descending 
artery utilizing a 4.0 x 20 mm long Synergy drug-eluting stent postdilated to 
4.7 mm.

 

CONSENT:  The patient was interviewed and examined in the emergency room with 
the risk and benefits were explained.  He understood and wished to proceed.

 

APPROACH UTILIZED:  Of note, the right radial artery was assessed under 
ultrasound when the patient presented in this Cardiovascular Laboratory and 
found to be acceptable for an approach, as such it was utilized.

 

PRE-CARDIAC CATHETERIZATION LABORATORY RESULTS:  Hemoglobin and hematocrit of 
14.9 and 44 with platelet count 193,000.  BUN and creatinine of 23 and 1.1.  
Sodium 139, potassium 3.7, chloride 102, bicarb 25, INR 0.95.

 

EQUIPMENT UTILIZED:

1.  Right radial artery sheath - a 6-French Terumo GlideSheath.

2.  Diagnostic coronary catheters - a 5-French TIG4 catheter for left coronary 
artery and 5-French FR4 curve for the right coronary artery.

3.  Diagnostic guidewire was a Bey 260 length curve guidewire.

4.  The guide catheter was a 6-French one way VL 3.5 curve guide catheter.

5.  The interventional wire was a All Star 190 cm length.

6.  The stent utilized - was a 4.0 x 20 mm long Synergy drug-eluting stent.

7.  The post stent deployment balloon catheter - a NC Emerge 4.5 x 12 mm 
dilated to achieve 4.6 mm.

8.  The left heart catheterization catheter - a 5-French PIG Performa catheter.

9.  The closure device was a radial band by [employer].

 

MEDICATIONS GIVEN:  During the cardiac catheterization included heparin 
intravenously for anticoagulation, Brilinta 180 mg, radial artery cocktail of 
300 mcg of nitroglycerin, 3 mg verapamil, intracoronary Nipride, and Versed.

 

DESCRIPTION OF PROCEDURE:  The patient was brought to the Cardiovascular 
Laboratory, where a formal time-out was performed.  He was prepped and draped 
in sterile fashion.  Under ultrasound guidance, the right radial artery was 
entered and the sheath was placed.  Diagnostic coronary arteriography was 
performed.  The decision was then made to intervene into the totally occluded 
proximal left anterior descending artery.  ACT was checked on initially coming 
in the cath lab and additional heparin therapy was given.

 

Following this, guiding views were obtained, the interventional wire was passed 
across the total occlusion and primary stenting was performed followed by post 
stent deployment balloon inflations with the NC Emerge balloon.  Following this
, the patient developed transient slow flow and 100 mcg of Nipride were given 
intracoronary with prompt improvement to LEANA-3 flow.

 

Following this, left heart catheterization was performed utilizing the PIG 
Performa radial catheter measuring central aortic pressure in the ascending 
aorta.  The catheter was then passed across the aortic valve into the left 
ventricle, where left ventricular pressure was recorded.  Left ventriculography 
was performed utilizing a total of 28 cc of an Omnipaque dye at a rate of 14 cc 
per second.  The catheter was then pulled back across the aortic valve to 
recheck aortic gradient. At the end of the case, the catheter and sheath removed
, then hemostasis was obtained with the Vasc Band.  The reverse Barbeau was a B.

 

The total contrast used was 135 cc of Omnipaque dye.  The radiation exposure 
included 9 minutes of fluoro time.  The air kerma radiation was 1159 mGy.  The 
DAP radiation was 6820 microgray per m2.

 

RESULTS:

 

HEMODYNAMIC DATA:  Left heart catheterization revealed central aortic pressure 
of 115/74 with a mean of 93, left ventricular pressure 118 over left 
ventricular end- diastolic pressure of 22.

 

LEFT VENTRICULOGRAPHY:  Performed in the NAPIER projection revealed akinesis of 
the mid anterior and apical region with preservation of the proximal and mid 
inferior, and distal inferior wall, the overall ejection fraction appeared to 
be approximately 35% to 40%.

 

CORONARY ARTERIOGRAPHY: 

   A.  Left coronary artery:

      1.  Left main - There was minimal narrowing of the distal left main of 
approximately 5% to 10%.

      2.  Left anterior descending artery - the left anterior descending artery 
was totally occluded in its proximal portion with mild prestenotic dilatation.  
There was retrograde filling of a high first diagonal branch via the circumflex 
obtuse marginal branches.

      3.  Circumflex artery - the circumflex artery supplied a thin first 
obtuse marginal branch with a moderate second obtuse marginal branch followed 
by 3 small caliber obtuse marginal branches and ending in a low-lying moderate-
sized posterior left ventricular branch.  There was a 40% to 45% narrowing seen 
in the mid portion of the circumflex artery.

   B.  Right coronary artery - a dominant vessel supplied the PDA and 3 
posterior left ventricular branches.  The first 2 of which were narrow in 
nature.  There were minimal luminal irregularities seen within the right 
coronary artery.

 

INTERVENTION INTO THE PROXIMAL LEFT ANTERIOR DESCENDING ARTERY:  

   Successful reconstitution of totally occluded proximal left anterior 
descending artery with primary stenting utilizing the 4.0 x 20 mm Synergy drug-
eluting stent postdilated to 4.7 mm with LEANA-3 flow, at the end no dissection 
seen and 0% residual stenosis.  Of note, the more proximal area did have 
prestenotic dilatation and the stent was expanded to try to proximate the 
proximal dimension.  On reconstitution of the left anterior descending artery, 
a mid diagonal branch had an ostial narrowing of 65% to 70%. Past this point in 
the mid LAD, there was a mild 30% narrowing.  The distal LAD extended to the 
apex, but not well onto the inferior wall.

 

OVERALL ASSESSMENT:  

   Moderate left ventricular systolic dysfunction, segmental in nature as 
described above.  Successful reconstitution of totally occluded proximal LAD 
with primary stenting utilizing a 4.0 x 20 mm long Synergy stent postdilated to 
4.5 mm with LEANA-3 flow and no dissection seen and 0% residual stenosis noted.  
Residual coronary artery disease with totally occluded first diagonal branch, 
which filled by collaterals via the circumflex artery suggest that this was 
probably an old occlusion and as such was not approached in this setting.  
Aggressive dual antiplatelet therapy and statin therapy in addition to beta-
blocker and ACE inhibition will be pursued.  Echocardiogram will be obtained as 
a baseline in the morning with consideration for followup echocardiogram 
perhaps prior to discharge to reassess LV function pending initial evaluation.  
The patient currently does not smoke as well and we will have Hospitalists 
evaluate the patient's diabetic management and add input with respect to this 
and consider an Endocrine consult as needed.

 

 [id number] #: [id number]

CALLUM

## 2019-03-04 NOTE — HP
CC:  Dr. Tung Alcazar *

 

ADMISSION HISTORY AND PHYSICAL:

 

DATE OF ADMISSION:  03/04/19

 

CHIEF COMPLAINT:  The patient presents with chest and back discomfort with 
abnormal EKG suggesting possible antral wall ST-segment elevation myocardial 
infarction.

 

HISTORY OF PRESENT ILLNESS:  The patient is a 53-year-old gentleman with no 
prior known cardiac history.  Specifically, denies any history of myocardial 
infarction, congestive heart failure, or significant heart rhythm disturbance.  
The patient states that he was in his usual state of health today playing 
soccer and stated that he got hit in the chest by an elbow.  His chest was 
somewhat sore for a short period of time, but the symptom completely went away.
  Later, he continued playing aggressively and then started noticing developing 
exertion-related chest discomfort that would wax and wane.  When he left, it 
came back again as he went up the stairs of his house, getting worse, and as 
such eventually an ambulance was called and he presented to the emergency room.
  In the emergency room, an EKG was performed and the emergency room physician, 
Dr. Munoz, called me to ask me to review the electrocardiogram.  He was 
concerned for the question of mild ST-segment elevation in V1 and slightly in 
V2 but also peaked T waves in V2 and V3.  There were reciprocal downsloping in 
II, III, aVF.  Interestingly, he had had an EKG done on 01/07/19 when he 
presented where he was somewhat tachycardic at 111, reportedly with a pneumonia 
but he did have ST-segment downsloping in II, III, aVF with subtle T-wave 
inversions in V3 through V6 at that time.  I do not know if any workup was 
performed then.  He states that the emergency room doctor had noted that he had 
a high cholesterol and a high sugar and started him on rosuvastatin 5 mg a day 
in addition to metformin.

 

When I saw the patient in the emergency room, he was still symptomatic.  I 
performed a bedside echocardiogram, which revealed antral wall hypokinesis.  
The risks and benefits of cardiac catheterization were explained to him.  Of 
note, prior to my coming, I spoke with the emergency room physician and because 
of the initial question of him being struck by an elbow, a CAT scan was 
performed to make sure that there was no possible dissection as the patient 
stated he felt discomfort in his back as well.  However, he did not describe it 
as a tearing sensation, more of a numbness sensation.  He also stated that he 
had mild diaphoresis with this as well.  The CAT scan was interpreted by the 
emergency room physician, who felt that there was no significant dissection.  
As such, the patient was brought to the cardiovascular lab for emergent cardiac 
catheterization and possible intervention.

 

Cardiac risk factors include a history of hyperlipidemia diagnosed 3 years ago, 
put on atorvastatin, but apparently did not tolerate it, stopped it, and went 
to diet control.  He has a history of recently noted increased glucose, for 
which he got started on the metformin on a recent emergency room visit.  He was 
on 500 mg b.i.d. He has a history of smoking for 20 years, 1 pack a day, but 
quit 7 years ago and he has a mother who had her first myocardial infarction at 
the age of 50.

 

PAST MEDICAL HISTORY:  Includes a history of kidney stones, last time he had 
them was in 2013.

 

PAST SURGICAL HISTORY:  Includes a right shoulder repair 2 years ago in 
addition to appendectomy and kidney stone issues.

 

ALLERGIES:  No known allergies.

 

FAMILY HISTORY:  Mother had her MI in the age of 50.  Brother had a history of 
hypertension.  Father had a history of a brain aneurysm.

 

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  He does not 
smoke anymore.  He uses alcohol weekly.

 

                               PHYSICAL EXAMINATION

 

VITAL SIGNS:  When I saw him in the emergency room, vital signs were noted to 
be blood pressure 137/94, pulse 58, respirations 18, O2 saturation 99%, 
afebrile.

 

HEENT:  Conjunctivae are pink.  Sclerae are clear.  Mouth revealed moist mucosa.

 

NECK:  Supple.  No increased JVP.  Carotid without bruits.

 

LUNGS:  Revealed no accessory muscle usage.  There was good excursion.  There 
were no active rales, rhonchi, or wheezes.

 

HEART:  Revealed no visible heaves.  No palpable heaves or thrills.  Normal S1, 
S2. Bradycardic rate was noted.  No significant systolic or diastolic murmurs 
are appreciated.

 

ABDOMEN:  Soft, nontender without organomegaly.

 

EXTREMITIES:  Without clubbing, cyanosis, or brianna pitting edema.  Peripheral 
pulses were intact.  Femoral pulses noted without bruits.

 

NEURO:  The patient is alert and oriented.

 

MUSCULOSKELETAL:  The patient moves all extremities appropriate.

 

PSYCHOLOGICAL:  The patient with appropriate affect, anxious in nature.

 

 DIAGNOSTIC STUDIES/LAB DATA:  Laboratory results pending at the time of this 
procedure.

 

Official chest x-ray report is pending as well.

 

OVERALL ASSESSMENT:  Mr. Box presents now with what appears to be in the 
throes of possibly an acute ST-segment elevation anterior wall myocardial 
infarction.  The risks and benefits of cardiac catheterization were explained 
to him.  We will be giving him 4000 units of heparin and performing the 
diagnostic cardiac catheterization and management appropriately.  He has 
already received full-dose aspirin by the paramedics prior to arrival.  Further 
management will be made pending the results of the catheterization.

 

 

 

[id number]/CPS #: [id number]


CALLUM

## 2019-03-04 NOTE — ECHO
Patient:      YENY BRAND   

[facility] Rec#:     [id number]            :          1965          

Date:         2019            Age:          53y                 

Account#:     [id number]          Height:       183 cm / 72.0 in

Accession#:   [id number]           Weight:       81 kg / 178.5 lbs

Sex:          M                     BSA:          2.03

Room#:        [facility]                

Admit Date#:  2019          

Type:         Inpatient

 

Referring:    Jayy Pulido MD

Reading:      J Carlos Dalton MD

Sonographer:  Khadra Aguero RDCS

CC:           Tung Alcazar MD

______________________________________________________________________

 

Transthoracic Echocardiogram

 

Indication:

CAD // s/p PCI

BP:           118/80

HR:           59

Rhythm:       NSR with PVCs

 

Findings     

History:

HLD,s/p PCI 3/4/2019 

 

Technical Comments:

The study quality is good.   Completed at 0846.  Definity used to

enhance images. 

 

Left Ventricle:

The left ventricular chamber size is normal. There are multiple regional

wall motion abnormalities.  There is severely decreased left ventricular

systolic function. The estimated ejection fraction is 25-30%.  Abnormal

left ventricular diastolic function is observed. The  mid anteroseptal,

mid anterolateral, apical septal, apical lateral, and  apical inferior

wall segments are hypokinetic (score 2). The  mid anterior, and  apical

anterior wall segments are akinetic (score 3). Overall wallmotion score

index is  2.29 

 

Left Atrium:

The left atrial chamber size is normal. 

 

Right Ventricle:

The right ventricular cavity size is normal. The right ventricular

global systolic function is normal. 

 

Right Atrium:

The right atrial cavity size is normal. 

 

Aortic Valve:

The aortic valve is trileaflet. There is a trace of aortic

regurgitation. There is no evidence of aortic stenosis. 

 

Mitral Valve:

The mitral valve leaflets are mildly thickened. There is moderate mitral

regurgitation.  There is no evidence of mitral stenosis. 

 

Tricuspid Valve:

The tricuspid valve leaflets are normal.  There is moderate tricuspid

regurgitation. There is evidence of mild to moderate pulmonary

hypertension. There is no tricuspid stenosis. 

 

Pulmonic Valve:

The pulmonic valve appears normal. There is no evidence of pulmonic

regurgitation. There is no pulmonic stenosis.  

 

Pericardium:

The pericardium appears normal. 

 

Aorta:

There is no dilatation of the ascending aorta. There is no dilatation of

the aortic arch. There is no dilation of the aortic root. 

 

Pulmonary Artery:

The main pulmonary artery appears normal. 

 

Venous:

The inferior vena cava is dilated.  There is an approximate 50%

respiratory change in the inferior vena cava dimension. 

 

Contrast:

Definity was used to optimize study.  Intravenous contrast was used to

enhance endocardial border definition. 

 

Summary:

There was not any prior study for comparison. 

 

Conclusions

There are multiple regional wall motion abnormalities. 

There is severely decreased left ventricular systolic function.

The estimated ejection fraction is 25-30%. 

The  mid anteroseptal, mid anterolateral, apical septal, apical lateral,

and  apical inferior wall segments are hypokinetic (score 2).

The  mid anterior, and  apical anterior wall segments are akinetic

(score 3).

There is no evidence of aortic stenosis.

There is moderate mitral regurgitation. 

There is moderate tricuspid regurgitation.

There is evidence of mild to moderate pulmonary hypertension.

The pericardium appears normal.

 

Measurements     

Name                    Value         Normal Range            

RVIDd (AP) 2D           2.9 cm        (0.9 - 2.6)             

RVDdMajor (2D)          3.7 cm        (2.2 - 4.4)             

RAd ISD 4CH             4.4 cm        (3.4 - 4.9)             

RA (A4C)W               3.7 cm        (2.9 - 4.6)             

IVSd (2D)               0.9 cm        (0.6 - 1)               

LVPWd (2D)              0.7 cm        (0.6 - 1)               

LVIDd (2D)              5 cm          (3.6 - 5.4)             

LVIDs (2D)              4.6 cm        -                        

LV FS (2D)              8 %           (25 - 45)               

Aortic Annulus          2.1 cm        (1.4 - 2.6)             

Ao root diameter (2D)   3.1 cm        (2.1 - 3.5)             

Ascending Ao            2.8 cm        (2.1 - 3.4)             

Aortic arch             2.6 cm        (1.8 - 3.4)             

Descending Ao           1 cm          -                        

LA dimension (AP) 2D    3.7 cm        (2.3 - 3.8)             

LAd ISD 4CH             4.3 cm        (2.9 - 5.3)             

LA ISD 4CH W            3.9 cm        (2.5 - 4.5)             

 

Name                    Value         Normal Range            

LA ESV SP 4CH (A/L)     18 ml         -                        

LA ESV SP 2CH (A/L)     14 ml         -                        

 

Name                    Value         Normal Range            

MV E-wave Vmax          0.8 m/sec     -                        

MV deceleration time    204 msec      -                        

MV A-wave Vmax          0.6 m/sec     -                        

LV septal e' Vmax       0.07 m/sec    -                        

LV lateral e' Vmax      0.08 m/sec    -                        

LV E:e' septal ratio    11.42 ratio   -                        

LV E:e' lateral ratio   10 ratio      -                        

 

Name                    Value         Normal Range            

AV Vmax                 1.1 m/sec     -                        

AV VTI                  22.5 cm       -                        

AV peak gradient        5 mmHg        -                        

AV mean gradient        2 mmHg        -                        

LVOT Vmax               0.9 m/sec     -                        

LVOT VTI                18.5 cm       -                        

LVOT peak gradient      3 mmHg        -                        

 

Name                    Value         Normal Range            

MR Vmax                 5.3 m/sec     -                        

MR VTI                  197 cm        -                        

 

Name                    Value         Normal Range            

TR Vmax                 3 m/sec       -                        

TR peak gradient        36 mmHg       -                        

RAP                     8 mmHg        -                        

RVSP                    44 mmHg       -                        

IVC diameter            2.4 cm        -                        

 

Name                    Value         Normal Range            

PV Vmax                 0.6 m/sec     -                        

PV peak gradient        2 mmHg        -                        

 

Wallmotion

 

BAS          Not Seen

BA           Not Seen

BAL          Not Seen

JAMES          Not Seen

BI           Not Seen

BIS          Not Seen

MAS          Hypokinetic

MA           Akinetic

MAL          Hypokinetic

MIL          Not Seen

MI           Not Seen

MIS          Not Seen

AS           Hypokinetic

AA           Akinetic

AL           Hypokinetic

AI           Hypokinetic

APEX         Akinetic

 

Electronically signed by: J Carlos Dalton MD on 2019 09:19:29

## 2019-03-04 NOTE — PN
Subjective


Date of Service: 03/04/19


Interval History: 





Medicine Consult Note:





54 yo M w PMH NIDDM, HLD, asthma who presented to the ER tonight with acute 

onset chest pain while playing soccer. He said it initially happened after he 

got elbowed during the game, it went away briefly but then returned and 

radiated down his R arm and to the back of his SOB, he had associated SOB. He 

finished the game and called his wife who told him to come to the ER.





In the ED /89, RR 23 Sattig 100% 99.9, EKG showed STD in 2, AVF, new TWI 

in 3, and TONY in V2,V3,V4/ Labs showed trop 0.03, LDL 83 otherwise normal. A 

code STEMI was called and Dr Fong presurented to the bedside, noted new 

RWMA and pt and family were counseled for caridac cathertization





During cardiac cath, complete occluliosn of proximal LAD s/p PCI. Cardiology 

admitted the pt to ICU for post cath monitoring.





Medicine is consulted for DM mgmt and comgmt. 





Brief Med Hx: NIDDM, Asthma, HLD, hx of nephrotlithasis


Surg Hx: Rotator Cuff rpeid, aooty, lithripsy


Social Hx: Distant former tob, social drinker never illcits, Lives at home with 

wife and child, works in [employer].


Family Hx: Sig mother who had early CAD


Allergies NKDA


ROS: Per HPI





I interviewed the pt in the ICU post cath and he is AOx3 feeling well with no 

complaint of pain, SOB or any sx at all aside from being hungry.





Objective


Active Medications: 








Acetaminophen (Tylenol Tab*)  650 mg PO Q4H PRN


   PRN Reason: HEADACHE/PAIN


Aspirin (Aspirin 81 Mg Chew Tab*)  81 mg PO DAILY JACQUELYN


Captopril (Capoten Tab*)  6.25 mg PO TID JACQUELYN


Dextrose (D50w Syringe 50 Ml*)  12.5 gm IV PUSH .FOR FS < 60 - SS PRN


   PRN Reason: FS < 60


Docusate Sodium (Colace Cap*)  100 mg PO DAILY PRN


   PRN Reason: CONSTIPATION


Sodium Chloride (Ns 0.9% 1000 Ml**)  1,000 mls @ 100 mls/hr IV PER RATE ONE


   Stop: 03/04/19 12:14


   Last Admin: 03/04/19 01:30 Dose:  100 mls/hr


Insulin Human Lispro (Humalog*)  0 units SUBCUT Q6HR [facility]; Protocol


   Last Admin: 03/04/19 06:20 Dose:  1 units


Metoprolol Tartrate (Lopressor Tab*)  12.5 mg PO BID [facility]


   Last Admin: 03/04/19 00:44 Dose:  12.5 mg


Nitroglycerin (Nitroglycerin Tab 0.4 Mg*)  0.4 mg SL Q5M PRN


   PRN Reason: ANGINA


Ondansetron HCl (Zofran Inj*)  4 mg IV Q4H PRN


   PRN Reason: NAUSEA


Rosuvastatin Calcium (Crestor (Nf))  40 mg PO 2100 [facility]


Ticagrelor (Brilinta*)  90 mg PO BID [facility]








 Vital Signs - 8 hr











  03/03/19 03/04/19 03/04/19





  23:12 00:22 00:30


 


Temperature 0 F  


 


Pulse Rate 0 70 65


 


Respiratory 0  23





Rate   


 


Blood Pressure 0/0  111/76





(mmHg)   


 


O2 Sat by Pulse 0 88 93





Oximetry   














  03/04/19 03/04/19 03/04/19





  00:40 01:00 01:30


 


Temperature 96.9 F  


 


Pulse Rate 62 87 70


 


Respiratory 19 25 28





Rate   


 


Blood Pressure 111/76 100/85 115/71





(mmHg)   


 


O2 Sat by Pulse 96 96 96





Oximetry   














  03/04/19 03/04/19 03/04/19





  02:00 02:30 02:51


 


Temperature   


 


Pulse Rate 69 64 


 


Respiratory 24 24 20





Rate   


 


Blood Pressure 105/80 105/80 





(mmHg)   


 


O2 Sat by Pulse 97 97 





Oximetry   














  03/04/19 03/04/19 03/04/19





  03:00 03:02 03:30


 


Temperature   


 


Pulse Rate 58 55 62


 


Respiratory 22 21 21





Rate   


 


Blood Pressure 95/76  103/81





(mmHg)   


 


O2 Sat by Pulse 93 96 94





Oximetry   














  03/04/19 03/04/19 03/04/19





  04:00 04:01 04:30


 


Temperature   


 


Pulse Rate 56 53 57


 


Respiratory 18 17 16





Rate   


 


Blood Pressure 97/74  99/75





(mmHg)   


 


O2 Sat by Pulse 96 97 97





Oximetry   














  03/04/19 03/04/19 03/04/19





  05:00 05:01 05:30


 


Temperature   


 


Pulse Rate 57 58 57


 


Respiratory 20 19 22





Rate   


 


Blood Pressure 103/77  113/81





(mmHg)   


 


O2 Sat by Pulse 97 96 97





Oximetry   














  03/04/19 03/04/19





  06:00 06:01


 


Temperature  


 


Pulse Rate 71 53


 


Respiratory 18 16





Rate  


 


Blood Pressure 118/80 





(mmHg)  


 


O2 Sat by Pulse  97





Oximetry  











Oxygen Devices in Use Now: None, Nasal Cannula


Appearance: Well man in NAD, pleasant


Ears/Nose/Mouth/Throat: NL Teeth, Lips, Gums, Mucous Membranes Moist


Neck: NL Appearance and Movements; NL JVP


Respiratory: Symmetrical Chest Expansion and Respiratory Effort, Clear to 

Auscultation


Cardiovascular: NL Sounds; No Murmurs; No JVD, RRR


Abdominal: NL Sounds; No Tenderness; No Distention


Lymphatic: No Cervical Adenopathy


Extremities: No Edema


Skin: - - R wrist s/p cath


Neurological: Alert and Oriented x 3


Result Diagrams: 


 03/04/19 05:20





 03/04/19 05:20


Microbiology and Other Data: 


 Microbiology











 03/04/19 00:27 Nasal Screen MRSA (PCR) - Final





 Nasal    Mrsa Not Detected











EKG Data: 





Post EKG with ectopy and resolution of TONY





Assess/Plan/Problems-Billing


Assessment: 


54 yo M w PMH NIDDM, HLD, [facility] found to have STEMI with occlusion of prox LAD. Medicine is 

consulted to follow with cardiology for comgmt for NIDDM.





1) CAD s/p recent PCI: DAPT, BB, ACEI, Statin as per cardiology


-Watch for post cath complications, ectopy, new CP


2) NIDDM-Hold home metformin, POC Glu Low dose SSI


3)Asthma-No active sx


4) HLD: On statin


5) DVT PPX: Ambulator


6) FEN: Heart healthy


7) Dispo: Stable to xfer to floor when cardiology allows





Status and Disposition: 





Stable

## 2019-03-05 LAB
ANION GAP SERPL CALC-SCNC: 7 MMOL/L (ref 2–11)
BASOPHILS # BLD AUTO: 0.1 10^3/UL (ref 0–0.2)
BUN SERPL-MCNC: 16 MG/DL (ref 6–24)
BUN/CREAT SERPL: 14.8 (ref 8–20)
CALCIUM SERPL-MCNC: 9.2 MG/DL (ref 8.6–10.3)
CHLORIDE SERPL-SCNC: 107 MMOL/L (ref 101–111)
EOSINOPHIL # BLD AUTO: 0.1 10^3/UL (ref 0–0.6)
GLUCOSE SERPL-MCNC: 138 MG/DL (ref 70–100)
HCO3 SERPL-SCNC: 27 MMOL/L (ref 22–32)
HCT VFR BLD AUTO: 46 % (ref 42–52)
HGB BLD-MCNC: 15.6 G/DL (ref 14–18)
LYMPHOCYTES # BLD AUTO: 1.5 10^3/UL (ref 1–4.8)
MCH RBC QN AUTO: 30 PG (ref 27–31)
MCHC RBC AUTO-ENTMCNC: 34 G/DL (ref 31–36)
MCV RBC AUTO: 86 FL (ref 80–94)
MONOCYTES # BLD AUTO: 0.7 10^3/UL (ref 0–0.8)
NEUTROPHILS # BLD AUTO: 6 10^3/UL (ref 1.5–7.7)
NRBC # BLD AUTO: 0 10^3/UL
NRBC BLD QL AUTO: 0.1
PLATELET # BLD AUTO: 192 10^3/UL (ref 150–450)
POTASSIUM SERPL-SCNC: 3.7 MMOL/L (ref 3.5–5)
RBC # BLD AUTO: 5.28 10^6/UL (ref 4–5.4)
SODIUM SERPL-SCNC: 141 MMOL/L (ref 135–145)
WBC # BLD AUTO: 8.5 10^3/UL (ref 3.5–10.8)

## 2019-03-05 RX ADMIN — ASPIRIN SCH MG: 81 TABLET, CHEWABLE ORAL at 08:08

## 2019-03-05 RX ADMIN — INSULIN LISPRO SCH: 100 INJECTION, SOLUTION INTRAVENOUS; SUBCUTANEOUS at 07:46

## 2019-03-05 RX ADMIN — CAPTOPRIL SCH MG: 12.5 TABLET ORAL at 20:39

## 2019-03-05 RX ADMIN — CAPTOPRIL SCH: 12.5 TABLET ORAL at 17:35

## 2019-03-05 RX ADMIN — INSULIN LISPRO SCH: 100 INJECTION, SOLUTION INTRAVENOUS; SUBCUTANEOUS at 18:30

## 2019-03-05 RX ADMIN — METOPROLOL TARTRATE SCH MG: 25 TABLET, FILM COATED ORAL at 08:08

## 2019-03-05 RX ADMIN — TICAGRELOR SCH MG: 90 TABLET ORAL at 20:40

## 2019-03-05 RX ADMIN — ROSUVASTATIN CALCIUM SCH MG: 20 TABLET, FILM COATED ORAL at 20:39

## 2019-03-05 RX ADMIN — INSULIN LISPRO SCH: 100 INJECTION, SOLUTION INTRAVENOUS; SUBCUTANEOUS at 12:28

## 2019-03-05 RX ADMIN — INSULIN LISPRO SCH UNIT: 100 INJECTION, SOLUTION INTRAVENOUS; SUBCUTANEOUS at 20:44

## 2019-03-05 RX ADMIN — CAPTOPRIL SCH MG: 12.5 TABLET ORAL at 08:09

## 2019-03-05 RX ADMIN — TICAGRELOR SCH MG: 90 TABLET ORAL at 08:08

## 2019-03-05 RX ADMIN — METOPROLOL TARTRATE SCH MG: 25 TABLET, FILM COATED ORAL at 20:40

## 2019-03-05 RX ADMIN — ENOXAPARIN SODIUM SCH MG: 100 INJECTION SUBCUTANEOUS at 20:41

## 2019-03-05 NOTE — PN
<VinnyanthonySaida - Last Filed: 03/05/19 10:30>





Subjective


Date of Service: 03/05/19 - s/p anterior MI


Interval History: 





s/p BIPIN proximal LAD 3/3/2019 LVEF < 30%. No recurrent symptoms. Patient doing 

well ambulating to bathroom in his room. No c/o dizziness, palpitations, chest 

pain or SOB. He is anxious to take a shower otherwise offers no complaints. 





Medications


Active Medications: 








Acetaminophen (Tylenol Tab*)  650 mg PO Q4H PRN


   PRN Reason: HEADACHE/PAIN


   Last Admin: 03/05/19 08:08 Dose:  650 mg


Aspirin (Aspirin 81 Mg Chew Tab*)  81 mg PO DAILY [facility]


   Last Admin: 03/05/19 08:08 Dose:  81 mg


Captopril (Capoten Tab*)  6.25 mg PO TID [facility]


   Stop: 03/05/19 21:00


   Last Admin: 03/05/19 08:09 Dose:  6.25 mg


Dextrose (D50w Syringe 50 Ml*)  12.5 gm IV PUSH .FOR FS < 60 - SS PRN


   PRN Reason: FS < 60


Docusate Sodium (Colace Cap*)  100 mg PO DAILY PRN


   PRN Reason: CONSTIPATION


Enoxaparin Sodium (Lovenox(*))  80 mg SUBCUT 2100 [facility]


   Last Admin: 03/04/19 21:14 Dose:  80 mg


Insulin Human Lispro (Humalog*)  0 units SUBCUT ACHS [facility]; Protocol


   Last Admin: 03/05/19 07:46 Dose:  Not Given


Lisinopril (Prinivil Tab*)  5 mg PO DAILY [facility]


Metoprolol Tartrate (Lopressor Tab*)  12.5 mg PO BID [facility]


   Last Admin: 03/05/19 08:08 Dose:  12.5 mg


Nitroglycerin (Nitroglycerin Tab 0.4 Mg*)  0.4 mg SL Q5M PRN


   PRN Reason: ANGINA


Ondansetron HCl (Zofran Inj*)  4 mg IV Q4H PRN


   PRN Reason: NAUSEA


Rosuvastatin Calcium (Crestor (Nf))  40 mg PO 2100 [facility]


   Last Admin: 03/04/19 21:14 Dose:  40 mg


Ticagrelor (Brilinta*)  90 mg PO BID [facility]


   Last Admin: 03/05/19 08:08 Dose:  90 mg








Objective


Vital Signs:











Temp Pulse Resp BP Pulse Ox


 


 97.6 F   63   17   106/81   97 


 


 03/05/19 04:56  03/05/19 07:00  03/05/19 08:00  03/05/19 08:00  03/05/19 08:00











Oxygen Devices in Use Now: None


Appearance: Well nourished, A+ O x3 NAD. appropriate affect


Ears/Nose/Mouth/Throat: NL Teeth, Lips, Gums, Clear Oropharnyx, Mucous 

Membranes Moist


Neck: NL Appearance and Movements; NL JVP, Trachea Midline, No Thyroid 

Enlargement, Masses


Respiratory: Symmetrical Chest Expansion and Respiratory Effort


Cardiovascular: NL Sounds; No Murmurs; No JVD, RRR, No Edema


Abdominal: NL Sounds; No Tenderness; No Distention


Extremities: No Edema


Skin: - - right radial access site is intact, strong bounding pulse non tender 

to palpation, no hematoma. 


Lines/Tubes/Other Access: Clean, Dry and Intact Peripheral IV


Laboratory Results: 


 





 03/05/19 05:18 





 03/05/19 05:18 





 











INR (Anticoag Therapy)  0.95  (0.77-1.02)   03/03/19  22:09    


 


APTT  22.9 seconds (26.0-36.3)  L  03/03/19  22:09    


 


Total Bilirubin  0.60 mg/dL (0.2-1.0)   03/04/19  05:20    


 


AST  149 U/L (13-39)  H  03/04/19  05:20    


 


ALT  48 U/L (7-52)   03/04/19  05:20    


 


Alkaline Phosphatase  57 U/L ()   03/04/19  05:20    


 


CK-MB (CK-2)  149.1 ng/mL (0.6-6.3)  H  03/04/19  18:30    


 


B-Natriuretic Peptide  17 pg/mL (<=100)   03/03/19  22:09    


 


Total Protein  6.1 g/dL (6.4-8.9)  L  03/04/19  05:20    


 


Albumin  4.0 g/dL (3.2-5.2)   03/04/19  05:20    


 


Globulin  2.1 g/dL (2-4)   03/04/19  05:20    


 


Albumin/Globulin Ratio  1.9  (1-3)   03/04/19  05:20    


 


Triglycerides  106 mg/dL  03/04/19  05:20    


 


Cholesterol  126 mg/dL  03/04/19  05:20    


 


LDL Cholesterol  67 mg/dL  03/04/19  05:20    


 


HDL Cholesterol  37.7 mg/dL  03/04/19  05:20    








 











  03/03/19 03/04/19 03/04/19





  22:09 05:20 12:35


 


Troponin I  0.03  65.91 H*  51.76 H*














  03/04/19





  18:30


 


Troponin I  38.36 H*








 Laboratory Results - last 24 hr











  03/04/19 03/04/19 03/04/19





  12:35 16:58 18:30


 


WBC   


 


RBC   


 


Hgb   


 


Hct   


 


MCV   


 


MCH   


 


MCHC   


 


RDW   


 


Plt Count   


 


MPV   


 


Neut % (Auto)   


 


Lymph % (Auto)   


 


Mono % (Auto)   


 


Eos % (Auto)   


 


Baso % (Auto)   


 


Absolute Neuts (auto)   


 


Absolute Lymphs (auto)   


 


Absolute Monos (auto)   


 


Absolute Eos (auto)   


 


Absolute Basos (auto)   


 


Absolute Nucleated RBC   


 


Nucleated RBC %   


 


Sodium   


 


Potassium   


 


Chloride   


 


Carbon Dioxide   


 


Anion Gap   


 


BUN   


 


Creatinine   


 


Est GFR ( Amer)   


 


Est GFR (Non-Af Amer)   


 


BUN/Creatinine Ratio   


 


Glucose  169 H  


 


POC Glucose (mg/dL)   178 H 


 


Calcium   


 


Total Creatine Kinase  1762 H   1610 H


 


CK-MB (CK-2)  236.1 H   149.1 H


 


Troponin I  51.76 H*   38.36 H*














  03/04/19 03/05/19 03/05/19





  21:08 05:18 05:18


 


WBC    8.5


 


RBC    5.28


 


Hgb    15.6


 


Hct    46


 


MCV    86


 


MCH    30


 


MCHC    34


 


RDW    13


 


Plt Count    192


 


MPV    8.8


 


Neut % (Auto)    71.3


 


Lymph % (Auto)    18.3


 


Mono % (Auto)    8.0


 


Eos % (Auto)    1.7


 


Baso % (Auto)    0.7


 


Absolute Neuts (auto)    6.0


 


Absolute Lymphs (auto)    1.5


 


Absolute Monos (auto)    0.7


 


Absolute Eos (auto)    0.1


 


Absolute Basos (auto)    0.1


 


Absolute Nucleated RBC    0


 


Nucleated RBC %    0.1


 


Sodium   141 


 


Potassium   3.7 


 


Chloride   107 


 


Carbon Dioxide   27 


 


Anion Gap   7 


 


BUN   16 


 


Creatinine   1.08 


 


Est GFR ( Amer)   86.5 


 


Est GFR (Non-Af Amer)   71.5 


 


BUN/Creatinine Ratio   14.8 


 


Glucose   138 H 


 


POC Glucose (mg/dL)  153 H  


 


Calcium   9.2 


 


Total Creatine Kinase   


 


CK-MB (CK-2)   


 


Troponin I   














  03/05/19





  07:44


 


WBC 


 


RBC 


 


Hgb 


 


Hct 


 


MCV 


 


MCH 


 


MCHC 


 


RDW 


 


Plt Count 


 


MPV 


 


Neut % (Auto) 


 


Lymph % (Auto) 


 


Mono % (Auto) 


 


Eos % (Auto) 


 


Baso % (Auto) 


 


Absolute Neuts (auto) 


 


Absolute Lymphs (auto) 


 


Absolute Monos (auto) 


 


Absolute Eos (auto) 


 


Absolute Basos (auto) 


 


Absolute Nucleated RBC 


 


Nucleated RBC % 


 


Sodium 


 


Potassium 


 


Chloride 


 


Carbon Dioxide 


 


Anion Gap 


 


BUN 


 


Creatinine 


 


Est GFR ( Amer) 


 


Est GFR (Non-Af Amer) 


 


BUN/Creatinine Ratio 


 


Glucose 


 


POC Glucose (mg/dL)  143 H


 


Calcium 


 


Total Creatine Kinase 


 


CK-MB (CK-2) 


 


Troponin I 











Diagnostic Imaging: 





Echo 3/4/2019: LVEF 25-30% with mild to moderate MR





[facility] please review cath report from 3/4/2019


EKG Data: 





3/5/2019 NSR rate 66 with anerolateral depressed TW consistent with prior 

infarct





Telemetry reviewed. Sinus bradycardia rate 50-60





Assessment/Plan





#1 Anterior MI 3/3/2019 s/p BIPIN proximal LAD 3/4/2019. Troponin peaked at 51 3/4

/2019. LVEF 25-30%. No recurrent c/o chest pain. on ASA 81/day Brilinta 90mg Po 

BID, Lopressor 12.5mg Po BID and Crestor 40mg/day. LDL 67. Will have patient 

ambulate halls if no ventricular ectopy and he is stable will transfer to  

Telemetry.


#2 ICM; LVEF 25-30% Compensated on exam. Will transition Captopril to 

Lisinopril 5mg /day starting tomorrow. Continue Lopressor 12/5mg Po BID unable 

to increase due to relative bradycardia. Will repeat limited echo to evaluate 

LVEF 3/6/2019 if still < 35% will order lifevest which patient is agreeable to. 


#3 h/o HLD; LDL is at goal on Crestor 40mg QHS. Recommend LDL < 70 given newly 

diagnosed CAD. 


#4 Mild to moderate MR; appears to be secondary to above #1. Again he is 

compensated on exam and is on ACEI


#5 Disposition pending course. Patient is full code. 


Attending: Stacey Carr





<Stacey Carr - Last Filed: 03/05/19 14:36>





Subjective


Interval History: 





Rare PVC w ambulation.  Ready for tx to tele








Medications


Active Medications: 








Acetaminophen (Tylenol Tab*)  650 mg PO Q4H PRN


   PRN Reason: HEADACHE/PAIN


   Last Admin: 03/05/19 08:08 Dose:  650 mg


Aspirin (Aspirin 81 Mg Chew Tab*)  81 mg PO DAILY [facility]


   Last Admin: 03/05/19 08:08 Dose:  81 mg


Captopril (Capoten Tab*)  6.25 mg PO TID [facility]


   Stop: 03/05/19 21:00


   Last Admin: 03/05/19 08:09 Dose:  6.25 mg


Dextrose (D50w Syringe 50 Ml*)  12.5 gm IV PUSH .FOR FS < 60 - SS PRN


   PRN Reason: FS < 60


Docusate Sodium (Colace Cap*)  100 mg PO DAILY PRN


   PRN Reason: CONSTIPATION


Enoxaparin Sodium (Lovenox(*))  80 mg SUBCUT 2100 [facility]


   Last Admin: 03/04/19 21:14 Dose:  80 mg


Insulin Human Lispro (Humalog*)  0 units SUBCUT ACHS [facility]; Protocol


   Last Admin: 03/05/19 12:28 Dose:  Not Given


Lisinopril (Prinivil Tab*)  5 mg PO DAILY [facility]


Metoprolol Tartrate (Lopressor Tab*)  12.5 mg PO BID [facility]


   Last Admin: 03/05/19 08:08 Dose:  12.5 mg


Nitroglycerin (Nitroglycerin Tab 0.4 Mg*)  0.4 mg SL Q5M PRN


   PRN Reason: ANGINA


Ondansetron HCl (Zofran Inj*)  4 mg IV Q4H PRN


   PRN Reason: NAUSEA


Rosuvastatin Calcium (Crestor (Nf))  40 mg PO 2100 [facility]


   Last Admin: 03/04/19 21:14 Dose:  40 mg


Ticagrelor (Brilinta*)  90 mg PO BID [facility]


   Last Admin: 03/05/19 08:08 Dose:  90 mg








Objective


Vital Signs:











Temp Pulse Resp BP Pulse Ox


 


 97.3 F   63   17   106/81   97 


 


 03/05/19 11:40  03/05/19 07:00  03/05/19 08:00  03/05/19 08:00  03/05/19 08:00











Laboratory Results: 


 





 03/05/19 05:18 





 03/05/19 05:18 





 











INR (Anticoag Therapy)  0.95  (0.77-1.02)   03/03/19  22:09    


 


APTT  22.9 seconds (26.0-36.3)  L  03/03/19  22:09    


 


Total Bilirubin  0.60 mg/dL (0.2-1.0)   03/04/19  05:20    


 


AST  149 U/L (13-39)  H  03/04/19  05:20    


 


ALT  48 U/L (7-52)   03/04/19  05:20    


 


Alkaline Phosphatase  57 U/L ()   03/04/19  05:20    


 


CK-MB (CK-2)  149.1 ng/mL (0.6-6.3)  H  03/04/19  18:30    


 


B-Natriuretic Peptide  17 pg/mL (<=100)   03/03/19  22:09    


 


Total Protein  6.1 g/dL (6.4-8.9)  L  03/04/19  05:20    


 


Albumin  4.0 g/dL (3.2-5.2)   03/04/19  05:20    


 


Globulin  2.1 g/dL (2-4)   03/04/19  05:20    


 


Albumin/Globulin Ratio  1.9  (1-3)   03/04/19  05:20    


 


Triglycerides  106 mg/dL  03/04/19  05:20    


 


Cholesterol  126 mg/dL  03/04/19  05:20    


 


LDL Cholesterol  67 mg/dL  03/04/19  05:20    


 


HDL Cholesterol  37.7 mg/dL  03/04/19  05:20    








 











  03/03/19 03/04/19 03/04/19





  22:09 05:20 12:35


 


Troponin I  0.03  65.91 H*  51.76 H*














  03/04/19





  18:30


 


Troponin I  38.36 H*

## 2019-03-06 RX ADMIN — METOPROLOL TARTRATE SCH MG: 25 TABLET, FILM COATED ORAL at 20:57

## 2019-03-06 RX ADMIN — INSULIN LISPRO SCH UNIT: 100 INJECTION, SOLUTION INTRAVENOUS; SUBCUTANEOUS at 13:01

## 2019-03-06 RX ADMIN — TICAGRELOR SCH MG: 90 TABLET ORAL at 20:57

## 2019-03-06 RX ADMIN — ROSUVASTATIN CALCIUM SCH MG: 20 TABLET, FILM COATED ORAL at 20:57

## 2019-03-06 RX ADMIN — INSULIN LISPRO SCH: 100 INJECTION, SOLUTION INTRAVENOUS; SUBCUTANEOUS at 16:36

## 2019-03-06 RX ADMIN — LISINOPRIL SCH MG: 5 TABLET ORAL at 08:45

## 2019-03-06 RX ADMIN — INSULIN LISPRO SCH: 100 INJECTION, SOLUTION INTRAVENOUS; SUBCUTANEOUS at 08:42

## 2019-03-06 RX ADMIN — INSULIN LISPRO SCH UNIT: 100 INJECTION, SOLUTION INTRAVENOUS; SUBCUTANEOUS at 20:56

## 2019-03-06 RX ADMIN — METOPROLOL TARTRATE SCH MG: 25 TABLET, FILM COATED ORAL at 08:45

## 2019-03-06 RX ADMIN — TICAGRELOR SCH MG: 90 TABLET ORAL at 08:45

## 2019-03-06 RX ADMIN — ASPIRIN SCH MG: 81 TABLET, CHEWABLE ORAL at 08:45

## 2019-03-06 RX ADMIN — ENOXAPARIN SODIUM SCH MG: 40 INJECTION SUBCUTANEOUS at 20:57

## 2019-03-06 NOTE — ECHO
Patient:      YENY BRAND   

[facility] Rec#:     [id number]            :          1965          

Date:         2019            Age:          53y                 

Account#:     [id number]          Height:       183 cm / 72.0 in

Accession#:   [id number]           Weight:       76 kg / 167.5 lbs

Sex:          M                     BSA:          2

Room#:        [facility]                

Admit Date#:  2019          

Type:         Inpatient

 

Referring:    Stacey Carr MD

Reading:      Teresa Hager MD

Sonographer:  Maria Eugenia Maria RN RDCS

CC:           Tung Alcazar MD

______________________________________________________________________

 

Transthoracic Echocardiogram

 

Indication:

S/P Myocardial infarction with PCI

BP:           99/68

HR:           63

Rhythm:       NSR

 

Findings     

History:

S/P MI with PCI, ischemic cardiomyopathy, HLD. This is a LIMITED study

to reassess the LVEF. 

 

Technical Comments:

The study quality is fair.  

 

Left Ventricle:

There is a focal wall motion abnormality present. There is moderately

decreased left ventricular systolic function.Apical MI with akinesis and

hypokinesis involving the septum, posterior apex and the apical 1/3 of

the anterior wall. The estimated ejection fraction is 35-40%.  

 

Right Ventricle:

The right ventricular global systolic function is normal. 

 

Contrast:

Definity was used to optimize study.  A total of 4 ml of diluted

Definity was given IV to enhance endocardial border definition. 

 

Conclusions

Limited study.

There is moderately decreased left ventricular systolic function.Apical

MI with akinesis and hypokinesis involving the septum, posterior apex

and the apical 1/3 of the anterior wall.

The estimated ejection fraction is 35-40%. 

The right ventricular global systolic function is normal.

Compared with prior echo of 3/4/19, EF has improved from 25-30%.

 

Electronically signed by: Teresa Hager MD on 2019 15:18:19

## 2019-03-06 NOTE — PN
<Saida Maciel - Last Filed: 03/06/19 09:13>





Subjective


Date of Service: 03/06/19 - s/p Anterior MI


Interval History: 


No events last night. Patient offers no complaints. He denies chest pain, 

palpitations, sensation of heart racing, sob, skinner or dizziness. He has been 

ambulating the halls with no complaints. 








Medications


Active Medications: 








Acetaminophen (Tylenol Tab*)  650 mg PO Q4H PRN


   PRN Reason: HEADACHE/PAIN


   Last Admin: 03/05/19 08:08 Dose:  650 mg


Aspirin (Aspirin 81 Mg Chew Tab*)  81 mg PO DAILY [facility]


   Last Admin: 03/06/19 08:45 Dose:  81 mg


Dextrose (D50w Syringe 50 Ml*)  12.5 gm IV PUSH .FOR FS < 60 - SS PRN


   PRN Reason: FS < 60


Docusate Sodium (Colace Cap*)  100 mg PO DAILY PRN


   PRN Reason: CONSTIPATION


Enoxaparin Sodium (Lovenox(*))  80 mg SUBCUT 2100 [facility]


   Last Admin: 03/05/19 20:41 Dose:  80 mg


Insulin Human Lispro (Humalog*)  0 units SUBCUT ACHS [facility]; Protocol


   Last Admin: 03/06/19 08:42 Dose:  Not Given


Lisinopril (Prinivil Tab*)  5 mg PO DAILY [facility]


   Last Admin: 03/06/19 08:45 Dose:  5 mg


Metoprolol Tartrate (Lopressor Tab*)  12.5 mg PO BID [facility]


   Last Admin: 03/06/19 08:45 Dose:  12.5 mg


Nitroglycerin (Nitroglycerin Tab 0.4 Mg*)  0.4 mg SL Q5M PRN


   PRN Reason: ANGINA


Rosuvastatin Calcium (Crestor (Nf))  40 mg PO 2100 [facility]


   Last Admin: 03/05/19 20:39 Dose:  40 mg


Ticagrelor (Brilinta*)  90 mg PO BID [facility]


   Last Admin: 03/06/19 08:45 Dose:  90 mg








Objective


Vital Signs:











Temp Pulse Resp BP Pulse Ox


 


 98.4 F   64   20   116/75   99 


 


 03/06/19 08:00  03/06/19 08:00  03/06/19 08:00  03/06/19 08:00  03/06/19 08:00











Oxygen Devices in Use Now: None


Appearance: Well nourished, A+ O x3 NAD. appropriate affect


Ears/Nose/Mouth/Throat: NL Teeth, Lips, Gums, Clear Oropharnyx, Mucous 

Membranes Moist


Neck: NL Appearance and Movements; NL JVP, Trachea Midline, No Thyroid 

Enlargement, Masses


Respiratory: Symmetrical Chest Expansion and Respiratory Effort


Cardiovascular: NL Sounds; No Murmurs; No JVD, RRR, No Edema


Abdominal: NL Sounds; No Tenderness; No Distention


Extremities: No Edema


Skin: - - right radial access site is intact, strong bounding pulse non tender 

to palpation, no hematoma. 


Lines/Tubes/Other Access: Clean, Dry and Intact Peripheral IV


Laboratory Results: 


 





 03/05/19 05:18 





 03/05/19 05:18 





 











INR (Anticoag Therapy)  0.95  (0.77-1.02)   03/03/19  22:09    


 


APTT  22.9 seconds (26.0-36.3)  L  03/03/19  22:09    


 


Total Bilirubin  0.60 mg/dL (0.2-1.0)   03/04/19  05:20    


 


AST  149 U/L (13-39)  H  03/04/19  05:20    


 


ALT  48 U/L (7-52)   03/04/19  05:20    


 


Alkaline Phosphatase  57 U/L ()   03/04/19  05:20    


 


CK-MB (CK-2)  149.1 ng/mL (0.6-6.3)  H  03/04/19  18:30    


 


B-Natriuretic Peptide  17 pg/mL (<=100)   03/03/19  22:09    


 


Total Protein  6.1 g/dL (6.4-8.9)  L  03/04/19  05:20    


 


Albumin  4.0 g/dL (3.2-5.2)   03/04/19  05:20    


 


Globulin  2.1 g/dL (2-4)   03/04/19  05:20    


 


Albumin/Globulin Ratio  1.9  (1-3)   03/04/19  05:20    


 


Triglycerides  106 mg/dL  03/04/19  05:20    


 


Cholesterol  126 mg/dL  03/04/19  05:20    


 


LDL Cholesterol  67 mg/dL  03/04/19  05:20    


 


HDL Cholesterol  37.7 mg/dL  03/04/19  05:20    








 











  03/03/19 03/04/19 03/04/19





  22:09 05:20 12:35


 


Troponin I  0.03  65.91 H*  51.76 H*














  03/04/19





  18:30


 


Troponin I  38.36 H*








 Laboratory Results - last 24 hr











  03/05/19 03/05/19 03/06/19





  12:19 20:26 07:39


 


POC Glucose (mg/dL)  128 H  176 H  144 H











Diagnostic Imaging: 





Echo 3/4/2019: LVEF 25-30% with mild to moderate MR





[facility] please review cath report from 3/4/2019


EKG Data: 





3/6/2019 NSR rate 66 with anerolateral depressed TW consistent with prior 

infarct





Telemetry reviewed. Sinus bradycardia rate 50-60





Assessment/Plan





#1 Anterior MI 3/3/2019 s/p BIPIN proximal LAD 3/4/2019. Troponin peaked at 51 3/4

/2019. LVEF 25-30%. No recurrent c/o chest pain. on ASA 81/day Brilinta 90mg Po 

BID, Lopressor 12.5mg Po BID and Crestor 40mg/day. LDL 67. 


#2 ICM; LVEF 25-30% Compensated on exam. On Lisinopril 5mg /day ( first dose 

was this morning). Continue Lopressor 12/5mg Po BID unable to increase due to 

relative bradycardia. to have limited echo to evaluate LVEF today if still < 35

% will order lifevest which patient is agreeable to. 


#3 h/o HLD; LDL is at goal on Crestor 40mg QHS. Recommend LDL < 70 given newly 

diagnosed CAD. 


#4 Mild to moderate MR; appears to be secondary to above #1. Again he is 

compensated on exam and is on ACEI


#5 Disposition pending course. Patient is full code. He will need a note 

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 

next week and will need documentation to verify medical condition so he can be 

reimbursed. 


Attending: Stacey Carr





<Stacey Carr - Last Filed: 03/06/19 13:09>





Subjective


Interval History: 





As above, exam stable.  Awaiting ECHO for EF re vest.





Medications


Active Medications: 








Acetaminophen (Tylenol Tab*)  650 mg PO Q4H PRN


   PRN Reason: HEADACHE/PAIN


   Last Admin: 03/05/19 08:08 Dose:  650 mg


Aspirin (Aspirin 81 Mg Chew Tab*)  81 mg PO DAILY [facility]


   Last Admin: 03/06/19 08:45 Dose:  81 mg


Dextrose (D50w Syringe 50 Ml*)  12.5 gm IV PUSH .FOR FS < 60 - SS PRN


   PRN Reason: FS < 60


Docusate Sodium (Colace Cap*)  100 mg PO DAILY PRN


   PRN Reason: CONSTIPATION


Enoxaparin Sodium (Lovenox(*))  40 mg SUBCUT BID [facility]


Insulin Human Lispro (Humalog*)  0 units SUBCUT ACHS [facility]; Protocol


   Last Admin: 03/06/19 13:01 Dose:  1 unit


Lisinopril (Prinivil Tab*)  5 mg PO DAILY [facility]


   Last Admin: 03/06/19 08:45 Dose:  5 mg


Metoprolol Tartrate (Lopressor Tab*)  12.5 mg PO BID [facility]


   Last Admin: 03/06/19 08:45 Dose:  12.5 mg


Nitroglycerin (Nitroglycerin Tab 0.4 Mg*)  0.4 mg SL Q5M PRN


   PRN Reason: ANGINA


Rosuvastatin Calcium (Crestor (Nf))  40 mg PO 2100 [facility]


   Last Admin: 03/05/19 20:39 Dose:  40 mg


Ticagrelor (Brilinta*)  90 mg PO BID [facility]


   Last Admin: 03/06/19 08:45 Dose:  90 mg








Objective


Vital Signs:











Temp Pulse Resp BP Pulse Ox


 


 98.4 F   64   20   116/75   99 


 


 03/06/19 08:00  03/06/19 08:00  03/06/19 08:00  03/06/19 08:00  03/06/19 08:00











Laboratory Results: 


 





 03/05/19 05:18 





 03/05/19 05:18 





 











INR (Anticoag Therapy)  0.95  (0.77-1.02)   03/03/19  22:09    


 


APTT  22.9 seconds (26.0-36.3)  L  03/03/19  22:09    


 


Total Bilirubin  0.60 mg/dL (0.2-1.0)   03/04/19  05:20    


 


AST  149 U/L (13-39)  H  03/04/19  05:20    


 


ALT  48 U/L (7-52)   03/04/19  05:20    


 


Alkaline Phosphatase  57 U/L ()   03/04/19  05:20    


 


CK-MB (CK-2)  149.1 ng/mL (0.6-6.3)  H  03/04/19  18:30    


 


B-Natriuretic Peptide  17 pg/mL (<=100)   03/03/19  22:09    


 


Total Protein  6.1 g/dL (6.4-8.9)  L  03/04/19  05:20    


 


Albumin  4.0 g/dL (3.2-5.2)   03/04/19  05:20    


 


Globulin  2.1 g/dL (2-4)   03/04/19  05:20    


 


Albumin/Globulin Ratio  1.9  (1-3)   03/04/19  05:20    


 


Triglycerides  106 mg/dL  03/04/19  05:20    


 


Cholesterol  126 mg/dL  03/04/19  05:20    


 


LDL Cholesterol  67 mg/dL  03/04/19  05:20    


 


HDL Cholesterol  37.7 mg/dL  03/04/19  05:20    








 











  03/03/19 03/04/19 03/04/19





  22:09 05:20 12:35


 


Troponin I  0.03  65.91 H*  51.76 H*














  03/04/19





  18:30


 


Troponin I  38.36 H*

## 2019-03-07 VITALS — SYSTOLIC BLOOD PRESSURE: 96 MMHG | DIASTOLIC BLOOD PRESSURE: 58 MMHG

## 2019-03-07 RX ADMIN — LISINOPRIL SCH MG: 5 TABLET ORAL at 08:58

## 2019-03-07 RX ADMIN — TICAGRELOR SCH MG: 90 TABLET ORAL at 08:59

## 2019-03-07 RX ADMIN — INSULIN LISPRO SCH: 100 INJECTION, SOLUTION INTRAVENOUS; SUBCUTANEOUS at 12:24

## 2019-03-07 RX ADMIN — INSULIN LISPRO SCH UNIT: 100 INJECTION, SOLUTION INTRAVENOUS; SUBCUTANEOUS at 09:00

## 2019-03-07 RX ADMIN — ASPIRIN SCH MG: 81 TABLET, CHEWABLE ORAL at 08:58

## 2019-03-07 RX ADMIN — METOPROLOL TARTRATE SCH MG: 25 TABLET, FILM COATED ORAL at 08:58

## 2019-03-07 RX ADMIN — ENOXAPARIN SODIUM SCH MG: 40 INJECTION SUBCUTANEOUS at 08:59

## 2019-03-07 NOTE — DS
DISCHARGE SUMMARY:

 

DATE OF ADMISSION:  03/03/19

 

TENTATIVE DATE OF DISCHARGE PENDING NO COMPLICATIONS:  03/07/19

 

ATTENDING PHYSICIAN:  Dr. Stacey Carr, Interventional Cardiology* (dictated 
by Saida Maciel NP).

 

ADMITTING DIAGNOSES:

1.  Anterior ST-elevation myocardial infarction.

2.  History of hyperlipidemia.

3.  History of type 2 diabetes mellitus, on metformin therapy.

 

DISCHARGE DIAGNOSES:

1.  Anterior ST-elevation myocardial infarction, status post drug-eluting stent 
placement to left anterior descending.  Troponin peaked at 65.9 on 03/04/19.  
Left ventricular ejection fraction on repeat limited echocardiogram, 03/06/19,  
35% to 40%; on aspirin, Brilinta, Crestor, Lopressor, and lisinopril therapy.

2.  Ischemic cardiomyopathy, left ventricular ejection fraction 35% to 40%, 
compensated on physical examination.  We will continue Lopressor 12.5 mg p.o. 
b.i.d., lisinopril 5 mg a day.

3.  History of hyperlipidemia.  Rosuvastatin increased to 40 mg p.o. q.h.s. 
Patient will need repeat fasting lipid panel and liver function tests in 6 weeks
' time.  LDL upon presentation was 67.

4.  Coronary artery disease; on aspirin, statin, Brilinta, and beta-blockade 
therapy.

5.  History of diabetes mellitus.  The patient is to resume metformin therapy, 
however, he was instructed to follow up with primary care provider, Dr. Alcazar, 
in regards to initiating SGLT2 inhibitor.

 

PROCEDURES PERFORMED:

1.  The patient had left heart catheterization performed by Dr. Pulido, 
Interventional Cardiology, on 03/04/19 due to anterior STEMI.  Right radial 
artery was utilized for access.  Left main had distal 5% to 10% disease.

2.  % occluded in its proximal portion with mild prestenotic dilatation. 
There was retrograde filling of the high first diagonal branch via the 
circumflex obtuse marginal branches.

3.  Circumflex filled thin first OM branch with moderate second OM branch 
followed by 3 small caliber obtuse marginal branches.  There is 40% to 45% 
narrowing seen in the mid portion of the circumflex.

4.  Right coronary artery was a dominant vessel supplied the PDA, just luminal 
irregularities.

5.  The patient had drug-eluting stent placement in 2 LAD.

 

COMPLICATIONS:  None thus far.

 

COURSE OF HOSPITAL STAY:  This is a pleasant 53-year-old male patient who 
follows Dr. Alcazar due to history of hyperlipidemia and type 2 diabetes 
mellitus.  He presented to [facility] on 03/03/19 due to complaints of upper back pain 
radiating into his chest.  EKG revealed anterior STEMI, thus the patient was 
taken urgently to the cardiac cath lab where Dr. Jayy Pulido, Interventional 
Cardiology, performed left heart catheterization.  The patient had successful 
drug-eluting stent placement to LAD.  Post procedure was transferred to the 
ICU.  Cardiac enzymes were cycled and as mentioned before troponin peaked on 03/
04/19 at 65.9.  Initially, LVEF was severely reduced on 03/04/19 at 25% to 30%, 
however, repeat limited echo on 03/06/19 revealed LVEF of 35% to 40%, thus the 
patient will not go home with LifeVest.  The patient was initiated on Lopressor
, Brilinta, aspirin, and lisinopril therapy.  He has been tolerating 
medications.  He has ambulating halls with no ventricular ectopy.  He offers no 
complaints and has not had recurrent chest pain since having angiogram 
performed.  Today's blood work reveals a glucose of 151.  Most recent BMP was 
on 03/05/19; at that time, sodium was 141, potassium 3.7, chloride 107, carbon 
dioxide of 27, creatinine 1.08.  Last EKG was this morning, which reveals 
normal sinus rhythm, rate 63 with anterolateral T-wave depression.  In anterior 
leads V1 through V3, there is biphasic T-wave abnormalities that is comparable 
with the last 2 EKGs.  This is consistent with recent anterior STEMI.  Most 
recent set of vital signs, temperature is 98.1, pulse 70, respirations 20, 
oxygenation 97% on room air, blood pressure 95/61.  Please note the patient is 
not symptomatic.  He has been ambulating with no complaints of lightheadedness 
or dizziness.

 

The patient is to be discharged home; low cholesterol, low fat, diabetic diet.  
No driving until he is followed up in our clinic on 03/12/19 at 2:40 p.m. with 
Dr. Jayy Pulido.  The patient is aware to not lift anything more than 5 pounds 
for the next week.  Again, no driving until instructed by Cardiologist.

 

DISCHARGE MEDICATIONS:  Includes:

1.  Lisinopril 5 mg p.o. daily.

2.  Lopressor 12.5 mg p.o. b.i.d.

3.  Aspirin 81 mg a day.

4.  Brilinta 90 mg p.o. b.i.d.

5.  He was instructed to resume metformin therapy.

 

Please note again he is to follow up with Dr. Alcazar for consideration of 
initiating SGLT2 inhibitor.

 

The patient was given free 30-day supply of Brilinta therapy (patient will need 
uninterrupted dual antiplatelet therapy with aspirin and Brilinta for 12 months 
given presentation with STEMI).

 

Dr. Carr agrees the above assessment and plan.

 

____________________________________ 

SAIDA MACIEL NP

 

[id number]/[facility] #: [id number]

[facility]

## 2019-03-10 NOTE — HP
HISTORY AND PHYSICAL:

 

ADDENDUM: Addendum to history and physical from 03/04/19.

 

REVIEW OF SYSTEMS:  Pertinent to proceeding emergently to the cardiovascular 
laboratory for possible PCI for STEMI, include the patient with no prior 
history of stroke or TIA.  He has no known history of significant renal 
insufficiency but does have a history of kidney stones.  He does not have any 
hematochezia, hematemesis, or hematuria.  He is not allergic to contrast.

 

 

 

[id number]/[facility] #: [id number]

[facility]UM